# Patient Record
Sex: MALE | Race: WHITE | NOT HISPANIC OR LATINO | Employment: OTHER | URBAN - METROPOLITAN AREA
[De-identification: names, ages, dates, MRNs, and addresses within clinical notes are randomized per-mention and may not be internally consistent; named-entity substitution may affect disease eponyms.]

---

## 2020-11-06 ENCOUNTER — HOSPITAL ENCOUNTER (EMERGENCY)
Facility: MEDICAL CENTER | Age: 35
End: 2020-11-06
Attending: EMERGENCY MEDICINE

## 2020-11-06 ENCOUNTER — APPOINTMENT (OUTPATIENT)
Dept: RADIOLOGY | Facility: MEDICAL CENTER | Age: 35
End: 2020-11-06
Attending: EMERGENCY MEDICINE

## 2020-11-06 VITALS
RESPIRATION RATE: 20 BRPM | WEIGHT: 142.86 LBS | SYSTOLIC BLOOD PRESSURE: 123 MMHG | OXYGEN SATURATION: 98 % | TEMPERATURE: 98.3 F | HEART RATE: 98 BPM | BODY MASS INDEX: 20.45 KG/M2 | HEIGHT: 70 IN | DIASTOLIC BLOOD PRESSURE: 79 MMHG

## 2020-11-06 DIAGNOSIS — S83.91XA SPRAIN OF RIGHT KNEE, UNSPECIFIED LIGAMENT, INITIAL ENCOUNTER: ICD-10-CM

## 2020-11-06 PROCEDURE — 73562 X-RAY EXAM OF KNEE 3: CPT | Mod: RT

## 2020-11-06 PROCEDURE — 99283 EMERGENCY DEPT VISIT LOW MDM: CPT | Mod: EDC

## 2020-11-06 SDOH — HEALTH STABILITY: MENTAL HEALTH: HOW OFTEN DO YOU HAVE A DRINK CONTAINING ALCOHOL?: 2-3 TIMES A WEEK

## 2020-11-07 NOTE — ED NOTES
"...  Chief Complaint   Patient presents with   • Knee Pain     pt [place] and injured his right knee says he \"felt a pop\" and hyperextended knee.          /83   Pulse 82   Temp 36.5 °C (97.7 °F) (Temporal)   Resp 18   Ht 1.778 m (5' 10\")   Wt 64.8 kg (142 lb 13.7 oz)   SpO2 95%        Explained triage process, to waiting room. Asked to inform RN if questions or concerns arise.   "

## 2020-11-07 NOTE — ED NOTES
Chun Mckeon D/C'd.  Discharge instructions including s/s to return to ED, follow up appointments, hydration importance and sprain of knee provided to pt/family.    Parents verbalized understanding with no further questions and concerns.    Copy of discharge provided to pt/family.  Signed copy in chart.    Pt walked out of department with friend; pt in NAD, awake, alert, interactive and age appropriate.

## 2020-11-07 NOTE — ED NOTES
Pt walked to peds 52 and gown provided. Call light introduced. All questions and concerns addressed.

## 2020-11-07 NOTE — ED PROVIDER NOTES
"ED Provider Note      ER PROVIDER NOTE      CHIEF COMPLAINT  Chief Complaint   Patient presents with   • Knee Pain     pt stepped [place] the golf [place] today. and injured his right knee says he \"felt a pop\" and hyperextended knee.        HPI  Chun Mckeon is a 35 y.o. male who presents to the emergency department complaining of knee pain.  Patient was [place] the golf course getting ready for golf today when he walked inside and there is a on covered event that he stepped in, and feels he hyperextended his knee.  There is no twisting injury but has had pain to the knee since.  He is able to walk on it with some pain.  No focal weakness numbness or tingling.  No other injury or feelings of instability    REVIEW OF SYSTEMS  Pertinent positives include knee pain. Pertinent negatives include no weakness or numbness. See HPI for details. All other systems reviewed and are negative.    PAST MEDICAL HISTORY   has a past medical history of ASTHMA, Bronchitis, Localization-related (focal) (partial) epilepsy and epileptic syndromes with complex partial seizures, without mention of intractable epilepsy, and Tonsil, abscess.    SOCIAL HISTORY  Social History     Tobacco Use   • Smoking status: Former Smoker   • Tobacco comment: QUIT 2 MONTHS AGO   Substance Use Topics   • Alcohol use: Yes     Frequency: 2-3 times a week   • Drug use: Yes     Types: Marijuana, Cocaine, Oral, Inhaled     Comment: cannabis        SURGICAL HISTORY  patient denies any surgical history    CURRENT MEDICATIONS  Home Medications     Reviewed by Allyson Figueroa R.N. (Registered Nurse) on 11/06/20 at 1616  Med List Status: Not Addressed   Medication Last Dose Status   albuterol 108 (90 BASE) MCG/ACT Aero Soln inhalation aerosol  Active   albuterol 108 (90 BASE) MCG/ACT Aero Soln inhalation aerosol  Active                ALLERGIES  Allergies   Allergen Reactions   • Amoxicillin    • Pcn [Penicillins]        PHYSICAL EXAM  VITAL SIGNS: /83 " "  Pulse 82   Temp 36.5 °C (97.7 °F) (Temporal)   Resp 18   Ht 1.778 m (5' 10\")   Wt 64.8 kg (142 lb 13.7 oz)   SpO2 95%   BMI 20.50 kg/m²   Pulse ox interpretation: I interpret this pulse ox as normal.    Constitutional: Alert.  In no apparent distress.  HENT: Normocephalic, Atraumatic, Bilateral external ears normal. Nose normal.   Eyes: Pupils are equal and reactive. Conjunctiva normal, non-icteric.   Heart: Regular rate and rhythm, no murmurs.    Lungs: Clear to auscultation bilaterally.  Skin: Warm, Dry, No erythema, No rash.   Musculoskeletal: There is some tenderness over the medial joint line inferiorly to the right knee, no effusion, no erythema.  No laxity noted with anterior posterior drawer, no laxity noted with varus or valgus stress, positive Dinora's, otherwise no bony tenderness and no other tenderness or major deformities noted. No edema.  Neurologic: Alert, Grossly non-focal.   Psychiatric: Affect normal, Judgment normal, Mood normal, Appears appropriate    DIAGNOSTIC STUDIES / PROCEDURES        RADIOLOGY  DX-KNEE 3 VIEWS RIGHT   Final Result      No acute right knee fracture or dislocation identified.        The radiologist's interpretation of all radiological studies have been reviewed and independently viewed by me.    COURSE & MEDICAL DECISION MAKING  Nursing notes, VS, PMSFHx reviewed in chart.    4:39 PM - Patient seen and examined at bedside. Ordered for xray to evaluate his symptoms.     5:56 PM  Patient is reevaluated, updated on all results, plan for discharge      Decision Making:  This is a 35 y.o. male presented with knee pain after stepped into an uncovered event.  There is no appreciable laxity on exam, does have some pain elicited over his meniscus.  No feelings of instability, no evidence of fracture.  Patient already has crutches, will use these and follow-up with orthopedics    The patient will return for new or worsening symptoms and is stable at the time of " discharge.    The patient is referred to a primary physician for blood pressure management, diabetic screening, and for all other preventative health concerns.      DISPOSITION:  Patient will be discharged home in stable condition.    FOLLOW UP:  Sukh Gayle M.D.  [address] [facility] [id number] [phone]    In 1 week        OUTPATIENT MEDICATIONS:  New Prescriptions    No medications on file         FINAL IMPRESSION  1. Sprain of right knee, unspecified ligament, initial encounter        The note accurately reflects work and decisions made by me.  Shayne Lennon M.D.  11/6/2020  5:57 PM

## 2020-12-15 ENCOUNTER — APPOINTMENT (OUTPATIENT)
Dept: RADIOLOGY | Facility: MEDICAL CENTER | Age: 35
End: 2020-12-15
Attending: EMERGENCY MEDICINE

## 2020-12-15 ENCOUNTER — HOSPITAL ENCOUNTER (OUTPATIENT)
Facility: MEDICAL CENTER | Age: 35
End: 2020-12-16
Attending: EMERGENCY MEDICINE | Admitting: OTOLARYNGOLOGY

## 2020-12-15 DIAGNOSIS — J36 PERITONSILLAR ABSCESS: ICD-10-CM

## 2020-12-15 LAB
ANION GAP SERPL CALC-SCNC: 13 MMOL/L (ref 7–16)
BASOPHILS # BLD AUTO: 0.6 % (ref 0–1.8)
BASOPHILS # BLD: 0.1 K/UL (ref 0–0.12)
BLOOD CULTURE HOLD CXBCH: NORMAL
BLOOD CULTURE HOLD CXBCH: NORMAL
BUN SERPL-MCNC: 17 MG/DL (ref 8–22)
CALCIUM SERPL-MCNC: 10.2 MG/DL (ref 8.5–10.5)
CHLORIDE SERPL-SCNC: 99 MMOL/L (ref 96–112)
CO2 SERPL-SCNC: 26 MMOL/L (ref 20–33)
COVID ORDER STATUS COVID19: NORMAL
CREAT SERPL-MCNC: 0.8 MG/DL (ref 0.5–1.4)
EOSINOPHIL # BLD AUTO: 0.22 K/UL (ref 0–0.51)
EOSINOPHIL NFR BLD: 1.4 % (ref 0–6.9)
ERYTHROCYTE [DISTWIDTH] IN BLOOD BY AUTOMATED COUNT: 41.4 FL (ref 35.9–50)
GLUCOSE SERPL-MCNC: 84 MG/DL (ref 65–99)
HCT VFR BLD AUTO: 51.7 % (ref 42–52)
HGB BLD-MCNC: 17 G/DL (ref 14–18)
IMM GRANULOCYTES # BLD AUTO: 0.1 K/UL (ref 0–0.11)
IMM GRANULOCYTES NFR BLD AUTO: 0.6 % (ref 0–0.9)
INR PPP: 1.04 (ref 0.87–1.13)
LYMPHOCYTES # BLD AUTO: 1.95 K/UL (ref 1–4.8)
LYMPHOCYTES NFR BLD: 12.4 % (ref 22–41)
MCH RBC QN AUTO: 29.2 PG (ref 27–33)
MCHC RBC AUTO-ENTMCNC: 32.9 G/DL (ref 33.7–35.3)
MCV RBC AUTO: 88.7 FL (ref 81.4–97.8)
MONOCYTES # BLD AUTO: 1.74 K/UL (ref 0–0.85)
MONOCYTES NFR BLD AUTO: 11.1 % (ref 0–13.4)
NEUTROPHILS # BLD AUTO: 11.62 K/UL (ref 1.82–7.42)
NEUTROPHILS NFR BLD: 73.9 % (ref 44–72)
NRBC # BLD AUTO: 0 K/UL
NRBC BLD-RTO: 0 /100 WBC
PLATELET # BLD AUTO: 313 K/UL (ref 164–446)
PMV BLD AUTO: 9.2 FL (ref 9–12.9)
POTASSIUM SERPL-SCNC: 4.2 MMOL/L (ref 3.6–5.5)
PROTHROMBIN TIME: 13.9 SEC (ref 12–14.6)
RBC # BLD AUTO: 5.83 M/UL (ref 4.7–6.1)
S PYO DNA SPEC NAA+PROBE: DETECTED
SARS-COV+SARS-COV-2 AG RESP QL IA.RAPID: NOTDETECTED
SODIUM SERPL-SCNC: 138 MMOL/L (ref 135–145)
SPECIMEN SOURCE: NORMAL
WBC # BLD AUTO: 15.7 K/UL (ref 4.8–10.8)

## 2020-12-15 PROCEDURE — 80048 BASIC METABOLIC PNL TOTAL CA: CPT

## 2020-12-15 PROCEDURE — 87040 BLOOD CULTURE FOR BACTERIA: CPT

## 2020-12-15 PROCEDURE — G0378 HOSPITAL OBSERVATION PER HR: HCPCS

## 2020-12-15 PROCEDURE — 700105 HCHG RX REV CODE 258: Performed by: EMERGENCY MEDICINE

## 2020-12-15 PROCEDURE — 700101 HCHG RX REV CODE 250: Performed by: EMERGENCY MEDICINE

## 2020-12-15 PROCEDURE — 70491 CT SOFT TISSUE NECK W/DYE: CPT

## 2020-12-15 PROCEDURE — 96366 THER/PROPH/DIAG IV INF ADDON: CPT

## 2020-12-15 PROCEDURE — 700105 HCHG RX REV CODE 258: Performed by: OTOLARYNGOLOGY

## 2020-12-15 PROCEDURE — 700102 HCHG RX REV CODE 250 W/ 637 OVERRIDE(OP): Performed by: EMERGENCY MEDICINE

## 2020-12-15 PROCEDURE — 700111 HCHG RX REV CODE 636 W/ 250 OVERRIDE (IP): Performed by: OTOLARYNGOLOGY

## 2020-12-15 PROCEDURE — 700111 HCHG RX REV CODE 636 W/ 250 OVERRIDE (IP): Performed by: EMERGENCY MEDICINE

## 2020-12-15 PROCEDURE — A9270 NON-COVERED ITEM OR SERVICE: HCPCS | Performed by: OTOLARYNGOLOGY

## 2020-12-15 PROCEDURE — 96365 THER/PROPH/DIAG IV INF INIT: CPT

## 2020-12-15 PROCEDURE — 85025 COMPLETE CBC W/AUTO DIFF WBC: CPT

## 2020-12-15 PROCEDURE — C9803 HOPD COVID-19 SPEC COLLECT: HCPCS | Performed by: EMERGENCY MEDICINE

## 2020-12-15 PROCEDURE — 700117 HCHG RX CONTRAST REV CODE 255: Performed by: EMERGENCY MEDICINE

## 2020-12-15 PROCEDURE — 99285 EMERGENCY DEPT VISIT HI MDM: CPT

## 2020-12-15 PROCEDURE — 85610 PROTHROMBIN TIME: CPT

## 2020-12-15 PROCEDURE — 96375 TX/PRO/DX INJ NEW DRUG ADDON: CPT

## 2020-12-15 PROCEDURE — A9270 NON-COVERED ITEM OR SERVICE: HCPCS | Performed by: EMERGENCY MEDICINE

## 2020-12-15 PROCEDURE — 96367 TX/PROPH/DG ADDL SEQ IV INF: CPT

## 2020-12-15 PROCEDURE — 87651 STREP A DNA AMP PROBE: CPT

## 2020-12-15 PROCEDURE — 96376 TX/PRO/DX INJ SAME DRUG ADON: CPT

## 2020-12-15 PROCEDURE — 700102 HCHG RX REV CODE 250 W/ 637 OVERRIDE(OP): Performed by: OTOLARYNGOLOGY

## 2020-12-15 PROCEDURE — 87426 SARSCOV CORONAVIRUS AG IA: CPT

## 2020-12-15 RX ORDER — OXYCODONE HYDROCHLORIDE 5 MG/1
5 TABLET ORAL
Status: DISCONTINUED | OUTPATIENT
Start: 2020-12-15 | End: 2020-12-16 | Stop reason: HOSPADM

## 2020-12-15 RX ORDER — LIDOCAINE HYDROCHLORIDE 20 MG/ML
20 INJECTION, SOLUTION INFILTRATION; PERINEURAL ONCE
Status: COMPLETED | OUTPATIENT
Start: 2020-12-15 | End: 2020-12-15

## 2020-12-15 RX ORDER — SCOLOPAMINE TRANSDERMAL SYSTEM 1 MG/1
1 PATCH, EXTENDED RELEASE TRANSDERMAL
Status: DISCONTINUED | OUTPATIENT
Start: 2020-12-15 | End: 2020-12-16 | Stop reason: HOSPADM

## 2020-12-15 RX ORDER — DEXAMETHASONE SODIUM PHOSPHATE 4 MG/ML
6 INJECTION, SOLUTION INTRA-ARTICULAR; INTRALESIONAL; INTRAMUSCULAR; INTRAVENOUS; SOFT TISSUE EVERY 12 HOURS
Status: COMPLETED | OUTPATIENT
Start: 2020-12-15 | End: 2020-12-15

## 2020-12-15 RX ORDER — ONDANSETRON 2 MG/ML
4 INJECTION INTRAMUSCULAR; INTRAVENOUS ONCE
Status: COMPLETED | OUTPATIENT
Start: 2020-12-15 | End: 2020-12-15

## 2020-12-15 RX ORDER — ALBUTEROL SULFATE 90 UG/1
2 AEROSOL, METERED RESPIRATORY (INHALATION) EVERY 4 HOURS PRN
Status: DISCONTINUED | OUTPATIENT
Start: 2020-12-15 | End: 2020-12-16 | Stop reason: HOSPADM

## 2020-12-15 RX ORDER — ONDANSETRON 2 MG/ML
4 INJECTION INTRAMUSCULAR; INTRAVENOUS EVERY 4 HOURS PRN
Status: DISCONTINUED | OUTPATIENT
Start: 2020-12-15 | End: 2020-12-16 | Stop reason: HOSPADM

## 2020-12-15 RX ORDER — CLINDAMYCIN PHOSPHATE 900 MG/50ML
900 INJECTION, SOLUTION INTRAVENOUS EVERY 8 HOURS
Status: DISCONTINUED | OUTPATIENT
Start: 2020-12-15 | End: 2020-12-15

## 2020-12-15 RX ORDER — CLINDAMYCIN PHOSPHATE 900 MG/50ML
900 INJECTION, SOLUTION INTRAVENOUS EVERY 8 HOURS
Status: COMPLETED | OUTPATIENT
Start: 2020-12-16 | End: 2020-12-16

## 2020-12-15 RX ORDER — ACETAMINOPHEN 500 MG
1000 TABLET ORAL EVERY 6 HOURS
Status: DISCONTINUED | OUTPATIENT
Start: 2020-12-16 | End: 2020-12-16 | Stop reason: HOSPADM

## 2020-12-15 RX ORDER — DEXAMETHASONE SODIUM PHOSPHATE 4 MG/ML
10 INJECTION, SOLUTION INTRA-ARTICULAR; INTRALESIONAL; INTRAMUSCULAR; INTRAVENOUS; SOFT TISSUE ONCE
Status: COMPLETED | OUTPATIENT
Start: 2020-12-15 | End: 2020-12-15

## 2020-12-15 RX ORDER — IBUPROFEN 600 MG/1
600 TABLET ORAL 3 TIMES DAILY
Status: DISCONTINUED | OUTPATIENT
Start: 2020-12-15 | End: 2020-12-16 | Stop reason: HOSPADM

## 2020-12-15 RX ORDER — DIPHENHYDRAMINE HYDROCHLORIDE 50 MG/ML
25 INJECTION INTRAMUSCULAR; INTRAVENOUS EVERY 6 HOURS PRN
Status: DISCONTINUED | OUTPATIENT
Start: 2020-12-15 | End: 2020-12-16 | Stop reason: HOSPADM

## 2020-12-15 RX ORDER — HYDROCODONE BITARTRATE AND ACETAMINOPHEN 7.5; 325 MG/1; MG/1
1 TABLET ORAL EVERY 8 HOURS PRN
Qty: 15 TAB | Refills: 0 | Status: SHIPPED | OUTPATIENT
Start: 2020-12-15 | End: 2020-12-20

## 2020-12-15 RX ORDER — SODIUM CHLORIDE, SODIUM LACTATE, POTASSIUM CHLORIDE, CALCIUM CHLORIDE 600; 310; 30; 20 MG/100ML; MG/100ML; MG/100ML; MG/100ML
INJECTION, SOLUTION INTRAVENOUS CONTINUOUS
Status: ACTIVE | OUTPATIENT
Start: 2020-12-15 | End: 2020-12-16

## 2020-12-15 RX ORDER — CLINDAMYCIN HYDROCHLORIDE 300 MG/1
300 CAPSULE ORAL 4 TIMES DAILY
Qty: 40 CAP | Refills: 0 | Status: SHIPPED | OUTPATIENT
Start: 2020-12-15 | End: 2020-12-25

## 2020-12-15 RX ORDER — CLINDAMYCIN PHOSPHATE 900 MG/50ML
900 INJECTION, SOLUTION INTRAVENOUS ONCE
Status: COMPLETED | OUTPATIENT
Start: 2020-12-15 | End: 2020-12-15

## 2020-12-15 RX ORDER — MORPHINE SULFATE 4 MG/ML
4 INJECTION, SOLUTION INTRAMUSCULAR; INTRAVENOUS ONCE
Status: COMPLETED | OUTPATIENT
Start: 2020-12-15 | End: 2020-12-15

## 2020-12-15 RX ADMIN — DEXAMETHASONE SODIUM PHOSPHATE 6 MG: 4 INJECTION, SOLUTION INTRA-ARTICULAR; INTRALESIONAL; INTRAMUSCULAR; INTRAVENOUS; SOFT TISSUE at 21:11

## 2020-12-15 RX ADMIN — ONDANSETRON 4 MG: 2 INJECTION INTRAMUSCULAR; INTRAVENOUS at 16:09

## 2020-12-15 RX ADMIN — MORPHINE SULFATE 4 MG: 4 INJECTION INTRAVENOUS at 16:09

## 2020-12-15 RX ADMIN — ACETAMINOPHEN 1000 MG: 500 TABLET ORAL at 23:51

## 2020-12-15 RX ADMIN — DEXAMETHASONE SODIUM PHOSPHATE 10 MG: 4 INJECTION, SOLUTION INTRA-ARTICULAR; INTRALESIONAL; INTRAMUSCULAR; INTRAVENOUS; SOFT TISSUE at 16:17

## 2020-12-15 RX ADMIN — CLINDAMYCIN IN 5 PERCENT DEXTROSE 900 MG: 18 INJECTION, SOLUTION INTRAVENOUS at 18:37

## 2020-12-15 RX ADMIN — BENZOCAINE, BUTAMBEN, AND TETRACAINE HYDROCHLORIDE 1 SPRAY: .028; .004; .004 AEROSOL, SPRAY TOPICAL at 19:39

## 2020-12-15 RX ADMIN — SODIUM CHLORIDE, POTASSIUM CHLORIDE, SODIUM LACTATE AND CALCIUM CHLORIDE: 600; 310; 30; 20 INJECTION, SOLUTION INTRAVENOUS at 23:51

## 2020-12-15 RX ADMIN — LIDOCAINE HYDROCHLORIDE 20 ML: 20 INJECTION, SOLUTION INFILTRATION; PERINEURAL at 19:39

## 2020-12-15 RX ADMIN — IOHEXOL 100 ML: 350 INJECTION, SOLUTION INTRAVENOUS at 16:47

## 2020-12-15 RX ADMIN — CEFTRIAXONE SODIUM 1 G: 1 INJECTION, POWDER, FOR SOLUTION INTRAMUSCULAR; INTRAVENOUS at 16:14

## 2020-12-15 RX ADMIN — IBUPROFEN 600 MG: 600 TABLET, FILM COATED ORAL at 21:11

## 2020-12-15 NOTE — ED TRIAGE NOTES
"[facility]   35 y.o. male   Patient presents with:  Chief Complaint   Patient presents with   • Sore Throat     stated on Wednesday last week, patient is having trouble eating due to pain.        Pt ambulatory to triage for above complaint.     Patient has  Swollen tonsils (+3).  Patient has no stridor, or adventitious breath sounds.  Clear speech.     Pt is alert and oriented, speaking in full sentences, follows commands and responds appropriately to questions. NAD. Resp are even and unlabored.     Patient and staff wearing appropriate PPE    /96   Pulse (!) 102   Temp 36.2 °C (97.1 °F) (Temporal)   Resp 16   Ht 1.803 m (5' 11\")   Wt 65.8 kg (145 lb)   SpO2 97%   BMI 20.22 kg/m²     "

## 2020-12-16 VITALS
WEIGHT: 145 LBS | BODY MASS INDEX: 20.3 KG/M2 | RESPIRATION RATE: 20 BRPM | SYSTOLIC BLOOD PRESSURE: 110 MMHG | TEMPERATURE: 98 F | OXYGEN SATURATION: 99 % | HEART RATE: 90 BPM | DIASTOLIC BLOOD PRESSURE: 60 MMHG | HEIGHT: 71 IN

## 2020-12-16 PROCEDURE — 700102 HCHG RX REV CODE 250 W/ 637 OVERRIDE(OP): Performed by: OTOLARYNGOLOGY

## 2020-12-16 PROCEDURE — G0378 HOSPITAL OBSERVATION PER HR: HCPCS

## 2020-12-16 PROCEDURE — 96367 TX/PROPH/DG ADDL SEQ IV INF: CPT

## 2020-12-16 PROCEDURE — 96366 THER/PROPH/DIAG IV INF ADDON: CPT

## 2020-12-16 PROCEDURE — A9270 NON-COVERED ITEM OR SERVICE: HCPCS | Performed by: OTOLARYNGOLOGY

## 2020-12-16 PROCEDURE — 700101 HCHG RX REV CODE 250: Performed by: OTOLARYNGOLOGY

## 2020-12-16 RX ORDER — PREDNISONE 20 MG/1
TABLET ORAL
Qty: 6 TAB | Refills: 0 | Status: SHIPPED | OUTPATIENT
Start: 2020-12-16 | End: 2023-10-03

## 2020-12-16 RX ORDER — CLINDAMYCIN HYDROCHLORIDE 300 MG/1
300 CAPSULE ORAL 3 TIMES DAILY
Qty: 30 CAP | Refills: 0 | Status: SHIPPED | OUTPATIENT
Start: 2020-12-16 | End: 2023-10-03

## 2020-12-16 RX ADMIN — CLINDAMYCIN IN 5 PERCENT DEXTROSE 900 MG: 18 INJECTION, SOLUTION INTRAVENOUS at 03:00

## 2020-12-16 RX ADMIN — IBUPROFEN 600 MG: 600 TABLET, FILM COATED ORAL at 06:06

## 2020-12-16 RX ADMIN — ACETAMINOPHEN 1000 MG: 500 TABLET ORAL at 06:06

## 2020-12-16 NOTE — OP REPORT
DATE OF SERVICE:  12/15/2020     PREPROCEDURE DIAGNOSIS:  Right peritonsillar abscess.     POSTPROCEDURE DIAGNOSIS:  Right peritonsillar abscess.     PROCEDURE:  Incision and drainage of peritonsillar abscess.     SURGEON:  Sukh Colon MD.     INDICATIONS:  The patient has had increasing sore throat over the past six   days.  He presented to the emergency room today where evaluation including CT   scan demonstrated a 3 cm peritonsillar abscess on the right.  He presents now   for drainage.     DESCRIPTION OF PROCEDURE:  Patient had no trismus and the tonsillar fossa was   easy to access.  It was topically anesthetized with Hurricaine.  1% lidocaine   with 1:100,000 epinephrine was then used to infiltrate the superior and   anterior tonsillar pillar from the mid portion upwards.  After suitable effect   had been obtained, an 18-gauge needle was inserted into the abscess.  3 mL of   pus was obtained.  An incision was then made at the needle insertion site and   taken into the abscess.  The abscess was then more fully exteriorized with a   hemostat.  Suction was then placed.  A small amount of additional pus drained.    The patient tolerated the procedure well and there were no complications.        ______________________________  MD ZAKI WALLACE/DIEGO/BENNETT    DD:  12/15/2020 19:54  DT:  12/15/2020 22:18    Job#:  [id number]

## 2020-12-16 NOTE — PROGRESS NOTES
"S: Much better    O: /83   Pulse 87   Temp 36.6 °C (97.8 °F) (Temporal)   Resp 16   Ht 1.803 m (5' 11\")   Wt 65.8 kg (145 lb)   SpO2 99%   Recent Labs     12/15/20  1522   WBC 15.7*   RBC 5.83   HEMOGLOBIN 17.0   HEMATOCRIT 51.7   MCV 88.7   MCH 29.2   MCHC 32.9*   RDW 41.4   PLATELETCT 313   MPV 9.2     Recent Labs     12/15/20  1522   SODIUM 138   POTASSIUM 4.2   CHLORIDE 99   CO2 26   GLUCOSE 84   BUN 17   CREATININE 0.80   CALCIUM 10.2     I/O last 3 completed shifts:  In: 750 [I.V.:500]  Out: -   Decreased swelling right tonsil    A: HD 2 Right PT abscess    P: Drained last night.  Much better today.  DC home on cleocin 300 tid for 10 days and prednisone 20 bid for 3 days.  Card given for follow up.   "

## 2020-12-16 NOTE — ED NOTES
Procedure completed by ENT MD. New POC to admit pt. Pt currently resting comfortably in bed, VSS. Call light within reach. Will continue to monitor.

## 2020-12-16 NOTE — ED NOTES
Pt continues to be resting comfortably in bed. Given water per request. No complaints at this time. Call light within reach. Will continue to monitor

## 2020-12-16 NOTE — DISCHARGE INSTRUCTIONS
Peritonsillar Abscess    A peritonsillar abscess is a collection of pus in the back of the throat, behind the tonsils. It usually occurs when an infection of the throat or tonsils (tonsillitis) spreads into the tissues around the tonsils.  What are the causes?  The infection that leads to a peritonsillar abscess is usually caused by streptococcal bacteria.  What increases the risk?  You are more likely to develop this condition if:  · You have recently been diagnosed with an infection in your mouth or throat.  · You smoke.  · You have gum disease or gingivitis (periodontal disease).  What are the signs or symptoms?  Symptoms of this condition include:  · A sore throat, often with pain on just one side.  · Swollen, tender glands (lymph nodes) in the neck.  · Difficulty swallowing.  · Difficulty opening your mouth.  · Fever.  · Chills.  · Drooling because of difficulty swallowing saliva.  · Headache.  · Changes in how your voice sounds.  · Bad breath.  How is this diagnosed?  This condition may be diagnosed based on:  · Your symptoms and medical history.  · A physical exam.  · Imaging tests, such as ultrasound or CT scan.  · Testing a pus sample from the abscess. Your health care provider may collect a pus sample by swabbing the back of your throat or by removing some pus with a syringe and needle (needle aspiration).  How is this treated?  Treatment usually involves draining the pus from the abscess. This may be done through needle aspiration or by making an incision in the abscess and draining the fluid. You will also likely need to take antibiotic medicine.  Follow these instructions at home:  Medicines  · Take over-the-counter and prescription medicines only as told by your health care provider.  · If you were prescribed an antibiotic, take it as told by your health care provider. Do not stop taking the antibiotic even if your condition improves.  Eating and drinking    · Drink enough fluid to keep your urine pale  yellow.  · While your throat is sore, try only drinking liquids or eating only soft-textured foods such as yogurt and ice cream.  General instructions  · Rest as much as possible and get plenty of sleep.  · Return to your normal activities as told by your health care provider. Ask your health care provider what activities are safe for you.  · If your abscess was drained, gargle with a salt-water mixture 3-4 times a day or as needed. To make a salt-water mixture, completely dissolve ½-1 tsp of salt in 1 cup of warm water. Do not swallow this mixture.  · Do not use any products that contain nicotine or tobacco, such as cigarettes and e-cigarettes. If you need help quitting, ask your health care provider.  · Keep all follow-up visits as told by your health care provider. This is important.  Contact a health care provider if you have:  · More pain, swelling, redness, or pus in your throat.  · A headache.  · Lack of energy (lethargy).  · A general feeling of illness (malaise).  · A fever.  · Dizziness.  · Trouble swallowing.  · Trouble eating.  · Signs of dehydration, such as:  ? Light-headedness when standing.  ? Urinating less than usual.  ? A fast heart rate.  ? Dry mouth.  Get help right away if you:  · Have trouble talking.  · Have trouble breathing, or it is easier for you to breathe when you lean forward.  · Cough up blood or vomit blood.  · Have severe throat pain that does not get better with medicine.  Summary  · A peritonsillar abscess is a collection of pus in the back of the throat. It usually occurs when an infection of the throat or tonsils spreads.  · Symptoms include a sore throat, difficulty swallowing, fever, chills, and occasional drooling.  · This condition is treated by draining the abscess and taking antibiotic medicine.  · Call your health care provider if you have trouble swallowing or eating after treatment.  · Get help right away if you vomit blood or cough up blood after you receive  treatment.  This information is not intended to replace advice given to you by your health care provider. Make sure you discuss any questions you have with your health care provider.  Document Released: 12/18/2006 Document Revised: 11/30/2018 Document Reviewed: 11/01/2018  Elsevier Patient Education © 2020 [employer]

## 2020-12-16 NOTE — ED NOTES
Patient calm and in room at this time. Respirations even and unlabored. Patient denies any pain or other complaints at this time. IV ABT's infusing per MD order. Pt to be admitted. Awaiting bed placement.

## 2020-12-16 NOTE — DISCHARGE PLANNING
Patient has been fully independent, including driving, prior to this admission. Has a very supportive roommate, Tung, who will be picking patient up upon discharge.    Does not have insurance. Will need assistance in applying for Medicaid during this admission.    Does not have an Advance Directive - packet not provided since patient is aware that his parents, as next of kin, will become his decision makers, by default, should he become incapacitated.      Care Transition Team Assessment    Information Source  Orientation : Oriented x 4  Information Given By: Patient  Informant's Name: Chun Mckeon  Who is responsible for making decisions for patient? : Patient    Readmission Evaluation  Is this a readmission?: No    Elopement Risk  Legal Hold: No  Ambulatory or Self Mobile in Wheelchair: No-Not an Elopement Risk  Elopement Risk: Not at Risk for Elopement    Interdisciplinary Discharge Planning  Does Admitting Nurse Feel This Could be a Complex Discharge?: No  Primary Care Physician: KRISTIN Rios on [address] Lives with - Patient's Self Care Capacity: Unrelated Adult  Support Systems: Other (Comments)(Roommate Tung.)  Housing / Facility: 2 Story Apartment / Condo  Do You Take your Prescribed Medications Regularly: (Not on any regular medications.)  Able to Return to Previous ADL's: Yes  Mobility Issues: No  Prior Services: None  Patient Prefers to be Discharged to:: Home.  Assistance Needed: No  Durable Medical Equipment: Not Applicable    Discharge Preparedness  What is your plan after discharge?: Home with help  What are your discharge supports?: Parent, Other (comment)(Roommate Tung.)  Prior Functional Level: Ambulatory, Drives Self, Independent with Activities of Daily Living, Independent with Medication Management  Difficulity with ADLs: None  Difficulity with IADLs: None    Functional Assesment  Prior Functional Level: Ambulatory, Drives Self, Independent with Activities of Daily Living, Independent with  Medication Management    Finances  Financial Barriers to Discharge: No(Is employed.)  Prescription Coverage: No(Will apply for Medicaid.)    Vision / Hearing Impairment  Vision Impairment : Yes  Right Eye Vision: Impaired, Wears Glasses, Wears Contacts  Left Eye Vision: Impaired, Wears Glasses, Wears Contacts  Hearing Impairment : No    Values / Beliefs / Concerns  Values / Beliefs Concerns : No    Advance Directive  Advance Directive?: None(Understands his parents are decision makers by default.)  Advance Directive offered?: AD Booklet refused    Domestic Abuse  Have you ever been the victim of abuse or violence?: No  Physical Abuse or Sexual Abuse: No  Verbal Abuse or Emotional Abuse: No  Possible Abuse/Neglect Reported to:: Not Applicable    Psychological Assessment  History of Substance Abuse: Alcohol(Distant past.)  Substance Abuse Comments: Occasional alcohol now.  History of Psychiatric Problems: No  Non-compliant with Treatment: No  Newly Diagnosed Illness: Yes(Peritonsillar abscess.)    Discharge Risks or Barriers  Discharge risks or barriers?: Uninsured / underinsured  Patient risk factors: Uninsured or underinsured    Anticipated Discharge Information  Discharge Disposition: Still a Patient (30)  Discharge Address: [address] [place] [address] [place] [address]  Discharge Contact Phone Number: [phone]

## 2020-12-16 NOTE — ED NOTES
Med rec updated and complete. Allergies reviewed.  No antibiotic use in last 14 days      Home pharmacy  [facility]

## 2020-12-16 NOTE — ED PROVIDER NOTES
ED Provider Note    CHIEF COMPLAINT  Chief Complaint   Patient presents with   • Sore Throat     stated on Wednesday last week, patient is having trouble eating due to pain.        HPI  Chun Mckeon is a 35 y.o. male who presents to the emergency room today with complaints of sore throat and difficulty swallowing.  Patient states he has had symptoms since Wednesday but becoming progressively worse today he also felt some difficulty with his airway when he tried to sleep last night.  He has had no food since yesterday.  Pain in his throat mostly on the right side.  No nausea no vomiting he has had some chills but no fever.  Symptoms again progressively worse with pain described as aching to sharp rated currently at 8/10.    REVIEW OF SYSTEMS  See HPI for further details. All other systems are negative.      PAST MEDICAL HISTORY  Past Medical History:   Diagnosis Date   • ASTHMA    • Bronchitis    • Localization-related (focal) (partial) epilepsy and epileptic syndromes with complex partial seizures, without mention of intractable epilepsy     Secondary GTC   • Tonsil, abscess        FAMILY HISTORY  Family History   Problem Relation Age of Onset   • Stroke Neg Hx        SOCIAL HISTORY  Social History     Socioeconomic History   • Marital status: Single     Spouse name: Not on file   • Number of children: Not on file   • Years of education: Not on file   • Highest education level: Not on file   Occupational History   • Not on file   Social Needs   • Financial resource strain: Not on file   • Food insecurity     Worry: Not on file     Inability: Not on file   • Transportation needs     Medical: Not on file     Non-medical: Not on file   Tobacco Use   • Smoking status: Former Smoker   • Smokeless tobacco: Never Used   • Tobacco comment: QUIT 2 MONTHS AGO   Substance and Sexual Activity   • Alcohol use: Yes     Frequency: 2-3 times a week   • Drug use: Yes     Types: Marijuana, Cocaine, Oral, Inhaled     Comment:  "cannabis    • Sexual activity: Not Currently     Partners: Female   Lifestyle   • Physical activity     Days per week: Not on file     Minutes per session: Not on file   • Stress: Not on file   Relationships   • Social connections     Talks on phone: Not on file     Gets together: Not on file     Attends Buddhist service: Not on file     Active member of club or organization: Not on file     Attends meetings of clubs or organizations: Not on file     Relationship status: Not on file   • Intimate partner violence     Fear of current or ex partner: Not on file     Emotionally abused: Not on file     Physically abused: Not on file     Forced sexual activity: Not on file   Other Topics Concern   • Not on file   Social History Narrative   • Not on file       SURGICAL HISTORY  No past surgical history on file.    CURRENT MEDICATIONS  Home Medications     Reviewed by Gavino Archibald R.N. (Registered Nurse) on 12/15/20 at [address]  Med List Status: <None>   Medication Last Dose Status   albuterol 108 (90 BASE) MCG/ACT Aero Soln inhalation aerosol  Active   albuterol 108 (90 BASE) MCG/ACT Aero Soln inhalation aerosol  Active                ALLERGIES  Allergies   Allergen Reactions   • Amoxicillin      Has tolerated cephalosporins in the past   • Pcn [Penicillins]        PHYSICAL EXAM  VITAL SIGNS: /74   Pulse 98   Temp 37.3 °C (99.2 °F) (Oral)   Resp 16   Ht 1.803 m (5' 11\")   Wt 65.8 kg (145 lb)   SpO2 97%   BMI 20.22 kg/m²       Constitutional :  Well developed, Well nourished, No acute distress, Non-toxic appearance.   HENT: There is evidence of swelling over the right peritonsillar area with some deviation of the tonsil consistent with peritonsillar abscess there is erythema noted some purulent discharge cultures were obtained and pending.  Eyes: perrla  Neck: Normal range of motion, No tenderness, Supple, No stridor.   Lymphatic: Right submandibular lymphadenopathy.   Cardiovascular: Normal " heart rate, Normal rhythm, No murmurs, No rubs, No gallops.   Thorax & Lungs: Clear to auscultation all lung fields  Skin: Warm, Dry, No erythema, No rash.   Extremities: Intact distal pulses, No edema, No tenderness, No cyanosis, No clubbing.       RADIOLOGY/PROCEDURES  CT-SOFT TISSUE NECK WITH   Final Result      Right peritonsillar abscess measuring 3.1 x 2.3 x 2.6 cm with mass effect on the airway. Edema is seen extending along the right hypopharynx with mild edema along the right aryepiglottic fold.      Cervical lymphadenopathy, right greater than left is likely reactive.      Mild paranasal sinus disease.      Mild loss of height of T4, T5 and T6 has a chronic appearance.               COURSE & MEDICAL DECISION MAKING  Pertinent Labs & Imaging studies reviewed. (See chart for details)  CT scan does show right peritonsillar abscess patient was given Rocephin and placed on clindamycin Decadron was given IV.  Patient given pain medication strep screen was positive.  Discussed case with Dr. Colon who will be in to evaluate patient and possible drainage of peritonsillar abscess.  Patient be placed on clindamycin and Norco for pain may also use Tylenol Motrin.  Follow-up as directed return if persistent or worsening symptoms.  Covid test was negative.    FINAL IMPRESSION  1.  Acute peritonsillar abscess  2.  Strep tonsillitis/pharyngitis  3.      Electronically signed by: Nimesh Alaniz D.O., 12/15/2020

## 2020-12-16 NOTE — ED NOTES
Patient calm and in room at this time. Respirations even and unlabored. Patient denies any pain or other complaints at this time. Pt to be admitted. Awaiting bed placement.

## 2020-12-16 NOTE — ED NOTES
Patient calm and in room at this time. Respirations even and unlabored. Patient denies any pain or other complaints at this time. Pt up and ambulated to restroom and arian without issue. Gait steady and even. VS stable. Pt to be admitted. Awaiting bed placement.

## 2020-12-16 NOTE — H&P
DATE OF ADMISSION:  12/15/2020        DATE OF CONSULTATION: 12/15/2020.     HISTORY OF PRESENT ILLNESS:  The patient is a 35-year-old who has had   increasing sore throat over the past six days.  The patient began to have some   difficulty breathing last night when he was trying to sleep.  He has not been   able to eat since yesterday.  The pain is mostly on the right side.  He has a   history of recurrent peritonsillar abscess with two other episodes, last   about four years ago.     PAST MEDICAL HISTORY:    1.  Asthma.  2.  History of seizure disorder, currently resolved.  3.  History of recurrent peritonsillar abscesses as per HPI.     PAST SURGICAL HISTORY:  None.     ALLERGIES:  AMOXICILLIN, BUT HAS TOLERATED CEPHALOSPORINS IN THE PAST.     MEDICATIONS:  Albuterol, metered dose inhaler, 1 to 2 puffs every 6 hours as   needed.     REVIEW OF SYSTEMS:  The patient feels well and has no other medical issues   other than sore throat.     PHYSICAL EXAMINATION:    GENERAL:  Well-developed, well-nourished male in no acute distress.  VITAL SIGNS:  T-max 99.2, blood pressure 115/74, pulse 98, SpO2 97.  HEENT:  Pinnas, external auditory canals and tympanic membranes are normal.    The nasal exam externally is within normal limits.  The oral exam demonstrates   moderate swelling of the right peritonsillar region into the soft palate.    There is no trismus.    NECK:  Palpation of the neck is within normal limits.       DIAGNOSTIC STUDIES:  Review of the CT scan shows a 3 cm unilocular apparent   fluid collection.     LABORATORY DATA:  Reveals a white count of 15.7 with 73 neutrophils, 12   lymphocytes.  INR is 1.04.  Chemistries are within normal limits.  A strep   screen did reveal group A strep.  COVID test was negative.     IMPRESSION:  Right peritonsillar abscess.     PLAN:  In the Emergency Room the abscess was drained.  Please see this under a   separate note.  The patient tolerated the procedure well and 3 mL of pus  were   obtained.  An incision was then made and the abscess more fully exteriorized   and a little additional pus was obtained.  The patient tolerated the procedure   well and there were no complications.  He will be admitted overnight for   intravenous antibiotics and steroids and discharged in the morning.  I have   recommended tonsillectomy as this has been a recurrent problem.              ______________________________  DENISHA ADAMS MD    DLM/SUP    DD:  12/15/2020 19:52  DT:  12/15/2020 22:37    Job#:  [id number]

## 2020-12-16 NOTE — ED PROVIDER NOTES
ED PROVIDER NOTE    Scribed for Piedad Yanez M.D. by Jeremie Danielle. 12/15/2020, 6:00 PM.    This is an addendum to the note on Chun Mckeon. For further details and full chart entry, see the previously signed ED Provider Note written by Dr. Alaniz (ERP).      6:00 PM - I discussed the patient's case with Dr. Alaniz (ERP) who will transfer care of the patient to me at this time. Patient appears to have a peritonsillar abscess which is pending drainage by ENT. Plant to D/C afterwards with ENT follow up.      8:21 PM - RN reports that Dr. Colon (ENT) left after draining the abscess and requesting patient be admitted. Will attempt to contact him.     8:33 PM - I discussed the patient's case and the above findings with Dr. Colon (ENT) who informs me that he plans to admit the patient and will place orders in shortly.     FINAL IMPRESSION   1. Peritonsillar abscess         Jeremie TRAN (Sirena), am scribing for, and in the presence of, Piedad Yanez M.D..    Electronically signed by: Jeremie Danielle (Parvizibkerry), 12/15/2020    Piedad TRAN M.D. personally performed the services described in this documentation, as scribed by Jeremie Danielle in my presence, and it is both accurate and complete.    The note accurately reflects work and decisions made by me.  Piedad Yanez M.D.  12/16/2020  12:29 AM

## 2020-12-20 LAB
BACTERIA BLD CULT: NORMAL
BACTERIA BLD CULT: NORMAL
SIGNIFICANT IND 70042: NORMAL
SIGNIFICANT IND 70042: NORMAL
SITE SITE: NORMAL
SITE SITE: NORMAL
SOURCE SOURCE: NORMAL
SOURCE SOURCE: NORMAL

## 2022-12-01 ENCOUNTER — HOSPITAL ENCOUNTER (EMERGENCY)
Facility: MEDICAL CENTER | Age: 37
End: 2022-12-01
Attending: EMERGENCY MEDICINE
Payer: COMMERCIAL

## 2022-12-01 VITALS
DIASTOLIC BLOOD PRESSURE: 64 MMHG | WEIGHT: 158 LBS | HEART RATE: 89 BPM | BODY MASS INDEX: 22.12 KG/M2 | OXYGEN SATURATION: 90 % | RESPIRATION RATE: 20 BRPM | SYSTOLIC BLOOD PRESSURE: 106 MMHG | HEIGHT: 71 IN | TEMPERATURE: 98 F

## 2022-12-01 DIAGNOSIS — R56.9 SEIZURE (HCC): ICD-10-CM

## 2022-12-01 DIAGNOSIS — B34.9 VIRAL ILLNESS: ICD-10-CM

## 2022-12-01 LAB
ALBUMIN SERPL BCP-MCNC: 4.6 G/DL (ref 3.2–4.9)
ALBUMIN/GLOB SERPL: 1.6 G/DL
ALP SERPL-CCNC: 80 U/L (ref 30–99)
ALT SERPL-CCNC: 17 U/L (ref 2–50)
ANION GAP SERPL CALC-SCNC: 16 MMOL/L (ref 7–16)
AST SERPL-CCNC: 20 U/L (ref 12–45)
BASOPHILS # BLD AUTO: 1.1 % (ref 0–1.8)
BASOPHILS # BLD: 0.1 K/UL (ref 0–0.12)
BILIRUB SERPL-MCNC: 2.1 MG/DL (ref 0.1–1.5)
BUN SERPL-MCNC: 16 MG/DL (ref 8–22)
CALCIUM SERPL-MCNC: 9.5 MG/DL (ref 8.5–10.5)
CHLORIDE SERPL-SCNC: 102 MMOL/L (ref 96–112)
CO2 SERPL-SCNC: 18 MMOL/L (ref 20–33)
CREAT SERPL-MCNC: 1.01 MG/DL (ref 0.5–1.4)
EOSINOPHIL # BLD AUTO: 0.24 K/UL (ref 0–0.51)
EOSINOPHIL NFR BLD: 2.6 % (ref 0–6.9)
ERYTHROCYTE [DISTWIDTH] IN BLOOD BY AUTOMATED COUNT: 44.4 FL (ref 35.9–50)
GFR SERPLBLD CREATININE-BSD FMLA CKD-EPI: 99 ML/MIN/1.73 M 2
GLOBULIN SER CALC-MCNC: 2.8 G/DL (ref 1.9–3.5)
GLUCOSE SERPL-MCNC: 170 MG/DL (ref 65–99)
HCT VFR BLD AUTO: 47.5 % (ref 42–52)
HGB BLD-MCNC: 15.8 G/DL (ref 14–18)
IMM GRANULOCYTES # BLD AUTO: 0.03 K/UL (ref 0–0.11)
IMM GRANULOCYTES NFR BLD AUTO: 0.3 % (ref 0–0.9)
LYMPHOCYTES # BLD AUTO: 1.96 K/UL (ref 1–4.8)
LYMPHOCYTES NFR BLD: 21 % (ref 22–41)
MCH RBC QN AUTO: 29 PG (ref 27–33)
MCHC RBC AUTO-ENTMCNC: 33.3 G/DL (ref 33.7–35.3)
MCV RBC AUTO: 87.3 FL (ref 81.4–97.8)
MONOCYTES # BLD AUTO: 0.94 K/UL (ref 0–0.85)
MONOCYTES NFR BLD AUTO: 10.1 % (ref 0–13.4)
NEUTROPHILS # BLD AUTO: 6.06 K/UL (ref 1.82–7.42)
NEUTROPHILS NFR BLD: 64.9 % (ref 44–72)
NRBC # BLD AUTO: 0 K/UL
NRBC BLD-RTO: 0 /100 WBC
PLATELET # BLD AUTO: 222 K/UL (ref 164–446)
PMV BLD AUTO: 9.4 FL (ref 9–12.9)
POTASSIUM SERPL-SCNC: 4.2 MMOL/L (ref 3.6–5.5)
PROT SERPL-MCNC: 7.4 G/DL (ref 6–8.2)
RBC # BLD AUTO: 5.44 M/UL (ref 4.7–6.1)
SODIUM SERPL-SCNC: 136 MMOL/L (ref 135–145)
WBC # BLD AUTO: 9.3 K/UL (ref 4.8–10.8)

## 2022-12-01 PROCEDURE — 96374 THER/PROPH/DIAG INJ IV PUSH: CPT

## 2022-12-01 PROCEDURE — 80053 COMPREHEN METABOLIC PANEL: CPT

## 2022-12-01 PROCEDURE — 99284 EMERGENCY DEPT VISIT MOD MDM: CPT

## 2022-12-01 PROCEDURE — 36415 COLL VENOUS BLD VENIPUNCTURE: CPT

## 2022-12-01 PROCEDURE — 85025 COMPLETE CBC W/AUTO DIFF WBC: CPT

## 2022-12-01 PROCEDURE — 700111 HCHG RX REV CODE 636 W/ 250 OVERRIDE (IP): Performed by: EMERGENCY MEDICINE

## 2022-12-01 PROCEDURE — 96375 TX/PRO/DX INJ NEW DRUG ADDON: CPT

## 2022-12-01 PROCEDURE — 700105 HCHG RX REV CODE 258: Performed by: EMERGENCY MEDICINE

## 2022-12-01 RX ORDER — SODIUM CHLORIDE 9 MG/ML
1000 INJECTION, SOLUTION INTRAVENOUS ONCE
Status: COMPLETED | OUTPATIENT
Start: 2022-12-01 | End: 2022-12-01

## 2022-12-01 RX ORDER — LORAZEPAM 2 MG/ML
1 INJECTION INTRAMUSCULAR ONCE
Status: COMPLETED | OUTPATIENT
Start: 2022-12-01 | End: 2022-12-01

## 2022-12-01 RX ORDER — LEVETIRACETAM 500 MG/5ML
30 INJECTION, SOLUTION, CONCENTRATE INTRAVENOUS ONCE
Status: COMPLETED | OUTPATIENT
Start: 2022-12-01 | End: 2022-12-01

## 2022-12-01 RX ORDER — LEVETIRACETAM 500 MG/1
500 TABLET ORAL 2 TIMES DAILY
Qty: 60 TABLET | Refills: 2 | Status: SHIPPED | OUTPATIENT
Start: 2022-12-01 | End: 2022-12-31

## 2022-12-01 RX ADMIN — LORAZEPAM 1 MG: 2 INJECTION INTRAMUSCULAR; INTRAVENOUS at 06:45

## 2022-12-01 RX ADMIN — SODIUM CHLORIDE 1000 ML: 9 INJECTION, SOLUTION INTRAVENOUS at 06:48

## 2022-12-01 RX ADMIN — LEVETIRACETAM 2150 MG: 100 INJECTION, SOLUTION INTRAVENOUS at 06:48

## 2022-12-01 NOTE — ED PROVIDER NOTES
ED Provider Note    CHIEF COMPLAINT  Chief Complaint   Patient presents with    Seizure     Seizure x 30 sec. History of Seizures. Off Keppra for 2-3 months.       HPI  Chun Mckeon is a 37 y.o. male who presents for evaluation after a grand mal seizure that lasted approximately 30 seconds.  The patient has a known history of seizures.  He states that he has been out of his Keppra for about a month.  He states has been unable to follow-up on an outpatient basis and get his refill.  He states has been taking Keppra 750 mg twice a day.  He states he did have some diarrhea yesterday.  He states that after the seizure does have some diffuse myalgias.  He is also noticed some congestion recently.  He does have a headache which is typical for him after his seizure.  He also bit the right side of his tongue.  Otherwise he is unaware of any injuries from the seizure.  The patient's last seizure was about 3 months ago.    REVIEW OF SYSTEMS  See HPI for further details. All other systems are negative.     PAST MEDICAL HISTORY  Past Medical History:   Diagnosis Date    ASTHMA     Bronchitis     Localization-related (focal) (partial) epilepsy and epileptic syndromes with complex partial seizures, without mention of intractable epilepsy     Secondary GTC    Tonsil, abscess        FAMILY HISTORY  [unfilled]    SOCIAL HISTORY  Social History     Socioeconomic History    Marital status: Single   Tobacco Use    Smoking status: Former    Smokeless tobacco: Never    Tobacco comments:     QUIT 2 MONTHS AGO   Vaping Use    Vaping Use: Every day   Substance and Sexual Activity    Alcohol use: Yes    Drug use: Yes     Types: Marijuana, Cocaine, Oral, Inhaled     Comment: cannabis     Sexual activity: Not Currently     Partners: Female       SURGICAL HISTORY  No past surgical history on file.    CURRENT MEDICATIONS  Home Medications    **Home medications have not yet been reviewed for this encounter**         ALLERGIES  Allergies  "  Allergen Reactions    Amoxicillin      Has tolerated cephalosporins in the past    Pcn [Penicillins]        PHYSICAL EXAM  VITAL SIGNS: /72   Pulse 89   Temp 36.8 °C (98.3 °F) (Temporal)   Resp 18   Ht 1.803 m (5' 11\")   Wt 71.7 kg (158 lb)   SpO2 94%   BMI 22.04 kg/m²       Constitutional: Well developed, Well nourished, No acute distress, Non-toxic appearance.   HENT: Normocephalic, Atraumatic, Bilateral external ears normal, Oropharynx small puncture wounds to the lateral aspect of the right tongue, Nose normal.   Eyes: PERRLA, EOMI, Conjunctiva normal, No discharge.   Neck: Normal range of motion, No tenderness, Supple, No stridor.   Lymphatic: No lymphadenopathy noted.   Cardiovascular: Normal heart rate, Normal rhythm, No murmurs, No rubs, No gallops.   Thorax & Lungs: Mild diffuse rhonchi, No respiratory distress, No wheezing, No chest tenderness.   Abdomen: Bowel sounds normal, Soft, No tenderness, No masses, No pulsatile masses.   Skin: Warm, Dry, No erythema, No rash.   Back: No tenderness, No CVA tenderness.   Extremities: Intact distal pulses, No edema, No tenderness, No cyanosis, No clubbing.   Neurologic: Alert & oriented x 3, Normal motor function, Normal sensory function, No focal deficits noted.   Psychiatric: Affect normal, Judgment normal, Mood normal.     Results for orders placed or performed during the hospital encounter of 12/01/22   CBC WITH DIFFERENTIAL   Result Value Ref Range    WBC 9.3 4.8 - 10.8 K/uL    RBC 5.44 4.70 - 6.10 M/uL    Hemoglobin 15.8 14.0 - 18.0 g/dL    Hematocrit 47.5 42.0 - 52.0 %    MCV 87.3 81.4 - 97.8 fL    MCH 29.0 27.0 - 33.0 pg    MCHC 33.3 (L) 33.7 - 35.3 g/dL    RDW 44.4 35.9 - 50.0 fL    Platelet Count 222 164 - 446 K/uL    MPV 9.4 9.0 - 12.9 fL    Neutrophils-Polys 64.90 44.00 - 72.00 %    Lymphocytes 21.00 (L) 22.00 - 41.00 %    Monocytes 10.10 0.00 - 13.40 %    Eosinophils 2.60 0.00 - 6.90 %    Basophils 1.10 0.00 - 1.80 %    Immature " Granulocytes 0.30 0.00 - 0.90 %    Nucleated RBC 0.00 /100 WBC    Neutrophils (Absolute) 6.06 1.82 - 7.42 K/uL    Lymphs (Absolute) 1.96 1.00 - 4.80 K/uL    Monos (Absolute) 0.94 (H) 0.00 - 0.85 K/uL    Eos (Absolute) 0.24 0.00 - 0.51 K/uL    Baso (Absolute) 0.10 0.00 - 0.12 K/uL    Immature Granulocytes (abs) 0.03 0.00 - 0.11 K/uL    NRBC (Absolute) 0.00 K/uL   COMP METABOLIC PANEL   Result Value Ref Range    Sodium 136 135 - 145 mmol/L    Potassium 4.2 3.6 - 5.5 mmol/L    Chloride 102 96 - 112 mmol/L    Co2 18 (L) 20 - 33 mmol/L    Anion Gap 16.0 7.0 - 16.0    Glucose 170 (H) 65 - 99 mg/dL    Bun 16 8 - 22 mg/dL    Creatinine 1.01 0.50 - 1.40 mg/dL    Calcium 9.5 8.5 - 10.5 mg/dL    AST(SGOT) 20 12 - 45 U/L    ALT(SGPT) 17 2 - 50 U/L    Alkaline Phosphatase 80 30 - 99 U/L    Total Bilirubin 2.1 (H) 0.1 - 1.5 mg/dL    Albumin 4.6 3.2 - 4.9 g/dL    Total Protein 7.4 6.0 - 8.2 g/dL    Globulin 2.8 1.9 - 3.5 g/dL    A-G Ratio 1.6 g/dL   ESTIMATED GFR   Result Value Ref Range    GFR (CKD-EPI) 99 >60 mL/min/1.73 m 2       COURSE & MEDICAL DECISION MAKING  Pertinent Labs & Imaging studies reviewed. (See chart for details)  This a 37-year-old gentleman who presents after a seizure.  He did have some congestion Lifes night and did not feel well.  Therefore I suspect his seizure threshold has been decreased by a viral process.  The patient has been seizure-free throughout his stay in the emergency department.  I did load the patient with Keppra and also gave him Ativan for seizure prophylaxis.  I will place the patient back on Keppra and put in a note for the scheduling department to get the patient into [facility] neurology within the next 30 days.  The patient will be treated supportively for the viral process.  He will return for any further seizure activity.  Laboratory analysis is reassuring he does have a slight acidosis most likely from the seizure and he did receive a liter of fluid.  He does not have a  leukocytosis.    FINAL IMPRESSION  1.  Seizure  2.  Viral illness    Disposition  The patient will be discharged in stable condition         Electronically signed by: Estiven Pillai M.D., 12/1/2022 6:26 AM

## 2022-12-01 NOTE — DISCHARGE INSTRUCTIONS
Stay well-hydrated.  Take Motrin as needed for fever and pain control.  Take the Keppra as prescribed and follow-up with neurology within 30 days.

## 2022-12-01 NOTE — ED TRIAGE NOTES
Pt presents to the ED via EMS for Seizure lasting approximately 30 sec's. Pt has a h/x of seizures and has been out of his Keppra for 2-3 months. Pt is complaining of headache. Pt is A&O x 4. Pt has a small abrasion on his tongue from the seizure. Pt has a complaint of chronic back pain. Pt states that he believes that when his back pain flares up that he can feel these seizures coming on. Pt breathing is easy and non labored. Pt denies cp or sob. Pt ambulated to the [facility] from the EMS cot with a steady gait.. Pt skin is w/d

## 2023-04-03 ENCOUNTER — HOSPITAL ENCOUNTER (EMERGENCY)
Facility: MEDICAL CENTER | Age: 38
End: 2023-04-03
Attending: EMERGENCY MEDICINE
Payer: COMMERCIAL

## 2023-04-03 VITALS
RESPIRATION RATE: 16 BRPM | BODY MASS INDEX: 21.05 KG/M2 | OXYGEN SATURATION: 98 % | HEIGHT: 70 IN | SYSTOLIC BLOOD PRESSURE: 118 MMHG | DIASTOLIC BLOOD PRESSURE: 78 MMHG | TEMPERATURE: 98 F | HEART RATE: 80 BPM | WEIGHT: 147.05 LBS

## 2023-04-03 DIAGNOSIS — R56.9 SEIZURE (HCC): ICD-10-CM

## 2023-04-03 LAB
ALBUMIN SERPL BCP-MCNC: 5.1 G/DL (ref 3.2–4.9)
ALBUMIN/GLOB SERPL: 1.6 G/DL
ALP SERPL-CCNC: 84 U/L (ref 30–99)
ALT SERPL-CCNC: 23 U/L (ref 2–50)
ANION GAP SERPL CALC-SCNC: 13 MMOL/L (ref 7–16)
AST SERPL-CCNC: 29 U/L (ref 12–45)
BASOPHILS # BLD AUTO: 0.5 % (ref 0–1.8)
BASOPHILS # BLD: 0.07 K/UL (ref 0–0.12)
BILIRUB SERPL-MCNC: 1.7 MG/DL (ref 0.1–1.5)
BUN SERPL-MCNC: 13 MG/DL (ref 8–22)
CALCIUM ALBUM COR SERPL-MCNC: 8.7 MG/DL (ref 8.5–10.5)
CALCIUM SERPL-MCNC: 9.6 MG/DL (ref 8.5–10.5)
CHLORIDE SERPL-SCNC: 102 MMOL/L (ref 96–112)
CO2 SERPL-SCNC: 22 MMOL/L (ref 20–33)
CREAT SERPL-MCNC: 0.83 MG/DL (ref 0.5–1.4)
EOSINOPHIL # BLD AUTO: 0.12 K/UL (ref 0–0.51)
EOSINOPHIL NFR BLD: 0.8 % (ref 0–6.9)
ERYTHROCYTE [DISTWIDTH] IN BLOOD BY AUTOMATED COUNT: 40.8 FL (ref 35.9–50)
GFR SERPLBLD CREATININE-BSD FMLA CKD-EPI: 116 ML/MIN/1.73 M 2
GLOBULIN SER CALC-MCNC: 3.1 G/DL (ref 1.9–3.5)
GLUCOSE SERPL-MCNC: 99 MG/DL (ref 65–99)
HCT VFR BLD AUTO: 53.4 % (ref 42–52)
HGB BLD-MCNC: 17.6 G/DL (ref 14–18)
IMM GRANULOCYTES # BLD AUTO: 0.05 K/UL (ref 0–0.11)
IMM GRANULOCYTES NFR BLD AUTO: 0.3 % (ref 0–0.9)
LYMPHOCYTES # BLD AUTO: 1.13 K/UL (ref 1–4.8)
LYMPHOCYTES NFR BLD: 7.6 % (ref 22–41)
MCH RBC QN AUTO: 28.4 PG (ref 27–33)
MCHC RBC AUTO-ENTMCNC: 33 G/DL (ref 33.7–35.3)
MCV RBC AUTO: 86.3 FL (ref 81.4–97.8)
MONOCYTES # BLD AUTO: 0.59 K/UL (ref 0–0.85)
MONOCYTES NFR BLD AUTO: 4 % (ref 0–13.4)
NEUTROPHILS # BLD AUTO: 12.87 K/UL (ref 1.82–7.42)
NEUTROPHILS NFR BLD: 86.8 % (ref 44–72)
NRBC # BLD AUTO: 0 K/UL
NRBC BLD-RTO: 0 /100 WBC
PLATELET # BLD AUTO: 209 K/UL (ref 164–446)
PMV BLD AUTO: 9.2 FL (ref 9–12.9)
POTASSIUM SERPL-SCNC: 3.8 MMOL/L (ref 3.6–5.5)
PROT SERPL-MCNC: 8.2 G/DL (ref 6–8.2)
RBC # BLD AUTO: 6.19 M/UL (ref 4.7–6.1)
SODIUM SERPL-SCNC: 137 MMOL/L (ref 135–145)
WBC # BLD AUTO: 14.8 K/UL (ref 4.8–10.8)

## 2023-04-03 PROCEDURE — 85025 COMPLETE CBC W/AUTO DIFF WBC: CPT

## 2023-04-03 PROCEDURE — 99284 EMERGENCY DEPT VISIT MOD MDM: CPT

## 2023-04-03 PROCEDURE — 80053 COMPREHEN METABOLIC PANEL: CPT

## 2023-04-03 PROCEDURE — 700111 HCHG RX REV CODE 636 W/ 250 OVERRIDE (IP): Performed by: EMERGENCY MEDICINE

## 2023-04-03 PROCEDURE — 36415 COLL VENOUS BLD VENIPUNCTURE: CPT

## 2023-04-03 PROCEDURE — 96374 THER/PROPH/DIAG INJ IV PUSH: CPT

## 2023-04-03 RX ORDER — LEVETIRACETAM 500 MG/1
500 TABLET ORAL 2 TIMES DAILY
Qty: 60 TABLET | Refills: 2 | Status: ON HOLD | OUTPATIENT
Start: 2023-04-03 | End: 2023-10-04

## 2023-04-03 RX ORDER — LEVETIRACETAM 500 MG/5ML
1500 INJECTION, SOLUTION, CONCENTRATE INTRAVENOUS ONCE
Status: COMPLETED | OUTPATIENT
Start: 2023-04-03 | End: 2023-04-03

## 2023-04-03 RX ORDER — LEVETIRACETAM 500 MG/5ML
1500 INJECTION, SOLUTION, CONCENTRATE INTRAVENOUS EVERY 12 HOURS
Status: DISCONTINUED | OUTPATIENT
Start: 2023-04-03 | End: 2023-04-03

## 2023-04-03 RX ADMIN — LEVETIRACETAM 1500 MG: 100 INJECTION, SOLUTION INTRAVENOUS at 13:54

## 2023-04-03 ASSESSMENT — FIBROSIS 4 INDEX: FIB4 SCORE: 0.76

## 2023-04-03 NOTE — ED NOTES
Patient discharged home per ERP.  Discharge teaching and education discussed with patient. POC discussed.   Patient verbalized understanding of discharge teaching and education. No other questions at this time.     RX for keppra sent to pt's pharmacy.   PIV removed.     VSS. Patient alert and oriented. Patient arranged ride for self. Able to ambulate with steady gait. Waiting for registration prior to discharge.

## 2023-04-03 NOTE — DISCHARGE INSTRUCTIONS
No driving until cleared by Dr. Montgomery or other neurologist and seizure-free on medications for at least 3 months    Would avoid alcohol until you are seizure-free for at least 3 months

## 2023-04-03 NOTE — ED TRIAGE NOTES
"Chief Complaint   Patient presents with    Seizure     Pt states he had a seizure this morning. Pt states Hx of seizure in the past, takes keppra but has been out for the past few months. Pt states he still feels \"a little fuzzy but otherwise ok\".     Pt educated upon triage process and told to inform  staff of any changes in condition so that Pt may be reassessed. No further questions at this time. Pt sitting out in lobby.    "

## 2023-04-03 NOTE — ED PROVIDER NOTES
"  ER Provider Note    Scribed for Fredy Luis M.d. by Susan Rosas. 4/3/2023  12:54 PM    Primary Care Provider: Pcp Pt States None    CHIEF COMPLAINT  Chief Complaint   Patient presents with    Seizure     Pt states he had a seizure this morning. Pt states Hx of seizure in the past, takes keppra but has been out for the past few months. Pt states he still feels \"a little fuzzy but otherwise ok\".     EXTERNAL RECORDS REVIEWED  Outpatient Notes The patient had a CT scan 12/12 which were not revealing for any masses. He was last hospitalized 8/22 where he had encephalopathy related to his seizures      HPI/ROS  LIMITATION TO HISTORY   Select: : None  OUTSIDE HISTORIAN(S):  Family The patient's sister and father assisted with the patients history.    Chun Mckeon is a 37 y.o. male, with a history of previous seizures, who presents to the ED complaining of seizure onset prior to arrival. The patient states that he has been off of Keppra for 2 months due to having issues seeing a neurologist. He does not have seizures when he is on Keppra.He last remembers that his girlfriend was on the phone and folding laundry before he blacked out. When he woke, she told him he had a seizure. Typically, he has precursor warnings that a seizure is coming on such as lightheadedness and tingling sensations, but this did not happen prior to his seizure today. Additionally, he notes that his postictal symptoms are much more mild in comparison to previous seizures. He denies any recent bad headaches in the last 3 weeks. He bit his tongue during the seizure. He noted that he vomited blood but believes  that has to do with biting his tongue He denies any urinary or bowel incontinence. His last seizure was 4 months ago. Prior to that, he did not have a seizure for 8 years. Per his sister, his doctors began to ween him off his medications, and he did not have a seizure for a while during this process. She notes that he was " "told that his diagnostic studies did not reveal that he had epilepsy. Per his sister, the patient's girlfriend has mentioned in passing that he has had some side effects from Keppra and wondered if he should try a new medication. He previously took 500 mg of Keppra twice per today. Additionally, he has had some problems with alcohol consumption. He owns a bar. He does not drink every day, however, when he is around friends he tends to binge. He denies tremors after not drinking for a few days, his family notes that he is typically irritable. His last drink was 4 days ago.     PAST MEDICAL HISTORY  Past Medical History:   Diagnosis Date    ASTHMA     Bronchitis     Localization-related (focal) (partial) epilepsy and epileptic syndromes with complex partial seizures, without mention of intractable epilepsy     Secondary GTC    Tonsil, abscess        SURGICAL HISTORY  History reviewed. No pertinent surgical history.    FAMILY HISTORY  Family History   Problem Relation Age of Onset    Stroke Neg Hx        SOCIAL HISTORY   reports that he has quit smoking. He has never used smokeless tobacco. He reports current alcohol use. He reports current drug use. Drugs: Marijuana, Cocaine, Oral, and Inhaled.    CURRENT MEDICATIONS  Previous Medications    ALBUTEROL 108 (90 BASE) MCG/ACT AERO SOLN INHALATION AEROSOL    Inhale 2 Puffs by mouth every 6 hours as needed for Shortness of Breath.    ALBUTEROL 108 (90 BASE) MCG/ACT AERO SOLN INHALATION AEROSOL    Inhale 2 Puffs by mouth every four hours as needed for Shortness of Breath.    CLINDAMYCIN (CLEOCIN) 300 MG CAP    Take 1 Cap by mouth 3 times a day.    PREDNISONE (DELTASONE) 20 MG TAB    1 pill twice a day for three days       ALLERGIES  Amoxicillin and Pcn [penicillins]    PHYSICAL EXAM  /82   Pulse 81   Temp 36.6 °C (97.8 °F) (Temporal)   Resp 16   Ht 1.778 m (5' 10\")   Wt 66.7 kg (147 lb 0.8 oz)   SpO2 95%   BMI 21.10 kg/m²   Constitutional: Well developed, Well " nourished, No acute distress, Non-toxic appearance.   HENT: Normocephalic, Atraumatic, Bilateral external ears normal, Oropharynx is clear mucous membranes are moist. No oral exudates or nasal discharge.   Eyes: Pupils are equal round and reactive, EOMI, Conjunctiva normal, No discharge.   Neck: Normal range of motion, No tenderness, Supple, No stridor. No meningismus.  Lymphatic: No lymphadenopathy noted.   Cardiovascular: Regular rate and rhythm without murmur rub or gallop.  Thorax & Lungs: Clear breath sounds bilaterally without wheezes, rhonchi or rales. There is no chest wall tenderness.   Abdomen: Soft non-tender non-distended. There is no rebound or guarding. No organomegaly is appreciated. Bowel sounds are normal.  Skin: Normal without rash.   Back: No CVA or spinal tenderness.   Extremities: Intact distal pulses, No edema, No tenderness, No cyanosis, No clubbing. Capillary refill is less than 2 seconds.  Musculoskeletal: Good range of motion in all major joints. No tenderness to palpation or major deformities noted.   Neurologic: Alert & oriented x 3, Normal motor function, Normal sensory function, No focal deficits noted. Reflexes are normal.  Psychiatric: Affect normal, Judgment normal, Mood normal. There is no suicidal ideation or patient reported hallucinations.     DIAGNOSTIC STUDIES    Labs:   Labs Reviewed   CBC WITH DIFFERENTIAL - Abnormal; Notable for the following components:       Result Value    WBC 14.8 (*)     RBC 6.19 (*)     Hematocrit 53.4 (*)     MCHC 33.0 (*)     Neutrophils-Polys 86.80 (*)     Lymphocytes 7.60 (*)     Neutrophils (Absolute) 12.87 (*)     All other components within normal limits   COMP METABOLIC PANEL - Abnormal; Notable for the following components:    Total Bilirubin 1.7 (*)     Albumin 5.1 (*)     All other components within normal limits   CORRECTED CALCIUM   ESTIMATED GFR       COURSE & MEDICAL DECISION MAKING     ED Observation Status? No; Patient does not meet  criteria for ED Observation.     INITIAL ASSESSMENT, COURSE AND PLAN  Care Narrative: Patient has a complex medical history that suggestive of him having episodic binge drinking which may be contributing to alcohol dependence neurologically and at the receptor level which is putting him at risk for withdrawal seizures which would explain how his EEG has been negative in the past through Dr. Montgomery    I did not repeat his head CT as this was unnecessary given my review of his CT back in 2012 which was unremarkable.  Laboratory evaluation reveals white blood cell count almost 15,000 with 87% PMN shift this is likely secondary to his recent seizure his electrolyte panel is unremarkable.    1:04 PM - Patient seen and examined at bedside. The patient will be medicated with Keppra 1,500. Ordered for CBC w/ diff and CMP  to evaluate his symptoms.  Informed the patient and his family that I do not believe it is the time to change medications. I informed that it would be best to first discuss the patients girlfriend in detail what her concerns are. Furthermore, after discussion of the patient's drinking habits I advised that he attempt to cease drinking alcohol for a while.    2:27 PM - I reevaluated the patient at bedside. I discussed plan for discharge and follow up as outlined below. The patient is stable for discharge at this time and will return for any new or worsening symptoms. Patient verbalizes understanding and support with my plan for discharge.         DISPOSITION AND DISCUSSIONS  I have discussed management of the patient with the following physicians and CALI's:  None    Discussion of management with other [facility] or appropriate source(s): None      The patient will return for new or worsening symptoms and is stable at the time of discharge.    DISPOSITION:  Patient will be discharged home in stable condition.    FOLLOW UP:  Hussain Tee M.D.  [address] [place] [address]  [id number]  [phone]    Schedule an appointment as soon as possible for a visit         OUTPATIENT MEDICATIONS:  New Prescriptions    LEVETIRACETAM (KEPPRA) 500 MG TAB    Take 1 Tablet by mouth 2 times a day.        FINAL DIANGOSIS  1. Seizure (HCC)       Susan TRAN (Parvizibkerry), am scribing for, and in the presence of, Fredy Luis M.D..    Electronically signed by: Susan Rosas (Sirena), 4/3/2023    Fredy TRAN M.D. personally performed the services described in this documentation, as scribed by Susan Rosas in my presence, and it is both accurate and complete.      The note accurately reflects work and decisions made by me.  Fredy Luis M.D.  4/3/2023  2:45 PM

## 2023-10-02 ENCOUNTER — APPOINTMENT (OUTPATIENT)
Dept: RADIOLOGY | Facility: MEDICAL CENTER | Age: 38
DRG: 101 | End: 2023-10-02
Attending: EMERGENCY MEDICINE
Payer: MEDICAID

## 2023-10-02 ENCOUNTER — HOSPITAL ENCOUNTER (INPATIENT)
Facility: MEDICAL CENTER | Age: 38
LOS: 1 days | DRG: 101 | End: 2023-10-04
Attending: EMERGENCY MEDICINE | Admitting: STUDENT IN AN ORGANIZED HEALTH CARE EDUCATION/TRAINING PROGRAM
Payer: MEDICAID

## 2023-10-02 DIAGNOSIS — Z78.9 ALCOHOL USE: ICD-10-CM

## 2023-10-02 DIAGNOSIS — R56.9 SEIZURE (HCC): ICD-10-CM

## 2023-10-02 LAB
ALBUMIN SERPL BCP-MCNC: 4.7 G/DL (ref 3.2–4.9)
ALBUMIN/GLOB SERPL: 1.7 G/DL
ALP SERPL-CCNC: 72 U/L (ref 30–99)
ALT SERPL-CCNC: 25 U/L (ref 2–50)
ANION GAP SERPL CALC-SCNC: 21 MMOL/L (ref 7–16)
AST SERPL-CCNC: 38 U/L (ref 12–45)
BILIRUB SERPL-MCNC: 1.3 MG/DL (ref 0.1–1.5)
BUN SERPL-MCNC: 14 MG/DL (ref 8–22)
CALCIUM ALBUM COR SERPL-MCNC: 8.4 MG/DL (ref 8.5–10.5)
CALCIUM SERPL-MCNC: 9 MG/DL (ref 8.5–10.5)
CHLORIDE SERPL-SCNC: 103 MMOL/L (ref 96–112)
CO2 SERPL-SCNC: 13 MMOL/L (ref 20–33)
CREAT SERPL-MCNC: 0.98 MG/DL (ref 0.5–1.4)
EKG IMPRESSION: NORMAL
GFR SERPLBLD CREATININE-BSD FMLA CKD-EPI: 102 ML/MIN/1.73 M 2
GLOBULIN SER CALC-MCNC: 2.8 G/DL (ref 1.9–3.5)
GLUCOSE SERPL-MCNC: 155 MG/DL (ref 65–99)
POTASSIUM SERPL-SCNC: 3.7 MMOL/L (ref 3.6–5.5)
PROT SERPL-MCNC: 7.5 G/DL (ref 6–8.2)
SODIUM SERPL-SCNC: 137 MMOL/L (ref 135–145)

## 2023-10-02 PROCEDURE — 80053 COMPREHEN METABOLIC PANEL: CPT

## 2023-10-02 PROCEDURE — 96374 THER/PROPH/DIAG INJ IV PUSH: CPT

## 2023-10-02 PROCEDURE — 70450 CT HEAD/BRAIN W/O DYE: CPT

## 2023-10-02 PROCEDURE — A9270 NON-COVERED ITEM OR SERVICE: HCPCS | Performed by: EMERGENCY MEDICINE

## 2023-10-02 PROCEDURE — 93005 ELECTROCARDIOGRAM TRACING: CPT | Performed by: EMERGENCY MEDICINE

## 2023-10-02 PROCEDURE — 700102 HCHG RX REV CODE 250 W/ 637 OVERRIDE(OP): Performed by: EMERGENCY MEDICINE

## 2023-10-02 PROCEDURE — 700111 HCHG RX REV CODE 636 W/ 250 OVERRIDE (IP): Mod: JZ,UD | Performed by: EMERGENCY MEDICINE

## 2023-10-02 PROCEDURE — 71101 X-RAY EXAM UNILAT RIBS/CHEST: CPT | Mod: RT

## 2023-10-02 PROCEDURE — 99285 EMERGENCY DEPT VISIT HI MDM: CPT

## 2023-10-02 PROCEDURE — 96375 TX/PRO/DX INJ NEW DRUG ADDON: CPT

## 2023-10-02 PROCEDURE — 36415 COLL VENOUS BLD VENIPUNCTURE: CPT

## 2023-10-02 RX ORDER — ONDANSETRON 2 MG/ML
4 INJECTION INTRAMUSCULAR; INTRAVENOUS ONCE
Status: COMPLETED | OUTPATIENT
Start: 2023-10-02 | End: 2023-10-02

## 2023-10-02 RX ORDER — OXYCODONE HYDROCHLORIDE AND ACETAMINOPHEN 5; 325 MG/1; MG/1
2 TABLET ORAL ONCE
Status: COMPLETED | OUTPATIENT
Start: 2023-10-02 | End: 2023-10-02

## 2023-10-02 RX ORDER — MORPHINE SULFATE 4 MG/ML
4 INJECTION INTRAVENOUS ONCE
Status: COMPLETED | OUTPATIENT
Start: 2023-10-02 | End: 2023-10-02

## 2023-10-02 RX ORDER — LEVETIRACETAM 500 MG/5ML
1000 INJECTION, SOLUTION, CONCENTRATE INTRAVENOUS ONCE
Status: COMPLETED | OUTPATIENT
Start: 2023-10-02 | End: 2023-10-02

## 2023-10-02 RX ADMIN — ONDANSETRON 4 MG: 2 INJECTION INTRAMUSCULAR; INTRAVENOUS at 20:48

## 2023-10-02 RX ADMIN — LEVETIRACETAM 1000 MG: 100 INJECTION, SOLUTION, CONCENTRATE INTRAVENOUS at 20:31

## 2023-10-02 RX ADMIN — OXYCODONE AND ACETAMINOPHEN 2 TABLET: 5; 325 TABLET ORAL at 21:48

## 2023-10-02 RX ADMIN — MORPHINE SULFATE 4 MG: 4 INJECTION, SOLUTION INTRAMUSCULAR; INTRAVENOUS at 20:48

## 2023-10-02 ASSESSMENT — LIFESTYLE VARIABLES
HAVE YOU EVER FELT YOU SHOULD CUT DOWN ON YOUR DRINKING: NO
HAVE PEOPLE ANNOYED YOU BY CRITICIZING YOUR DRINKING: NO
CONSUMPTION TOTAL: INCOMPLETE
EVER FELT BAD OR GUILTY ABOUT YOUR DRINKING: NO
DO YOU DRINK ALCOHOL: YES
TOTAL SCORE: 0
TOTAL SCORE: 0
EVER HAD A DRINK FIRST THING IN THE MORNING TO STEADY YOUR NERVES TO GET RID OF A HANGOVER: NO
TOTAL SCORE: 0

## 2023-10-02 ASSESSMENT — FIBROSIS 4 INDEX: FIB4 SCORE: 1.04

## 2023-10-02 NOTE — Clinical Note
Chun Mckeon was seen and treated in our emergency department on 10/2/2023.  He may return to work on 10/06/2023.       If you have any questions or concerns, please don't hesitate to call.      Clay Colon D.O.

## 2023-10-03 ENCOUNTER — APPOINTMENT (OUTPATIENT)
Dept: RADIOLOGY | Facility: MEDICAL CENTER | Age: 38
DRG: 101 | End: 2023-10-03
Payer: MEDICAID

## 2023-10-03 PROBLEM — Z72.0 VAPES NICOTINE CONTAINING SUBSTANCE: Status: ACTIVE | Noted: 2023-10-03

## 2023-10-03 PROBLEM — R56.9 SEIZURE (HCC): Status: ACTIVE | Noted: 2023-10-03

## 2023-10-03 PROBLEM — E87.29 METABOLIC ACIDOSIS, INCREASED ANION GAP: Status: ACTIVE | Noted: 2023-10-03

## 2023-10-03 PROBLEM — E87.29 METABOLIC ACIDOSIS, INCREASED ANION GAP: Status: RESOLVED | Noted: 2023-10-03 | Resolved: 2023-10-03

## 2023-10-03 PROBLEM — N17.9 AKI (ACUTE KIDNEY INJURY) (HCC): Status: ACTIVE | Noted: 2023-10-03

## 2023-10-03 LAB
ANION GAP SERPL CALC-SCNC: 14 MMOL/L (ref 7–16)
APPEARANCE UR: CLEAR
BACTERIA #/AREA URNS HPF: NEGATIVE /HPF
BILIRUB UR QL STRIP.AUTO: NEGATIVE
BUN SERPL-MCNC: 17 MG/DL (ref 8–22)
CALCIUM SERPL-MCNC: 8.9 MG/DL (ref 8.5–10.5)
CHLORIDE SERPL-SCNC: 101 MMOL/L (ref 96–112)
CO2 SERPL-SCNC: 20 MMOL/L (ref 20–33)
COLOR UR: YELLOW
CREAT SERPL-MCNC: 1.6 MG/DL (ref 0.5–1.4)
CREAT UR-MCNC: 117.46 MG/DL
EPI CELLS #/AREA URNS HPF: NEGATIVE /HPF
GFR SERPLBLD CREATININE-BSD FMLA CKD-EPI: 57 ML/MIN/1.73 M 2
GLUCOSE SERPL-MCNC: 96 MG/DL (ref 65–99)
GLUCOSE UR STRIP.AUTO-MCNC: NEGATIVE MG/DL
HYALINE CASTS #/AREA URNS LPF: ABNORMAL /LPF
KETONES UR STRIP.AUTO-MCNC: ABNORMAL MG/DL
LEUKOCYTE ESTERASE UR QL STRIP.AUTO: NEGATIVE
MICRO URNS: ABNORMAL
NITRITE UR QL STRIP.AUTO: NEGATIVE
PH UR STRIP.AUTO: 5.5 [PH] (ref 5–8)
POTASSIUM SERPL-SCNC: 4 MMOL/L (ref 3.6–5.5)
PROT UR QL STRIP: 100 MG/DL
RBC # URNS HPF: ABNORMAL /HPF
RBC UR QL AUTO: ABNORMAL
SODIUM SERPL-SCNC: 135 MMOL/L (ref 135–145)
SODIUM UR-SCNC: 46 MMOL/L
SP GR UR STRIP.AUTO: 1.01
URATE CRY #/AREA URNS HPF: POSITIVE /HPF
UROBILINOGEN UR STRIP.AUTO-MCNC: 0.2 MG/DL
WBC #/AREA URNS HPF: ABNORMAL /HPF

## 2023-10-03 PROCEDURE — 82570 ASSAY OF URINE CREATININE: CPT

## 2023-10-03 PROCEDURE — 700117 HCHG RX CONTRAST REV CODE 255

## 2023-10-03 PROCEDURE — 70553 MRI BRAIN STEM W/O & W/DYE: CPT

## 2023-10-03 PROCEDURE — A9579 GAD-BASE MR CONTRAST NOS,1ML: HCPCS

## 2023-10-03 PROCEDURE — 81001 URINALYSIS AUTO W/SCOPE: CPT

## 2023-10-03 PROCEDURE — A9270 NON-COVERED ITEM OR SERVICE: HCPCS

## 2023-10-03 PROCEDURE — 99254 IP/OBS CNSLTJ NEW/EST MOD 60: CPT | Performed by: PSYCHIATRY & NEUROLOGY

## 2023-10-03 PROCEDURE — A9270 NON-COVERED ITEM OR SERVICE: HCPCS | Performed by: STUDENT IN AN ORGANIZED HEALTH CARE EDUCATION/TRAINING PROGRAM

## 2023-10-03 PROCEDURE — 700102 HCHG RX REV CODE 250 W/ 637 OVERRIDE(OP): Performed by: STUDENT IN AN ORGANIZED HEALTH CARE EDUCATION/TRAINING PROGRAM

## 2023-10-03 PROCEDURE — 700102 HCHG RX REV CODE 250 W/ 637 OVERRIDE(OP)

## 2023-10-03 PROCEDURE — 80048 BASIC METABOLIC PNL TOTAL CA: CPT

## 2023-10-03 PROCEDURE — 700105 HCHG RX REV CODE 258

## 2023-10-03 PROCEDURE — 99223 1ST HOSP IP/OBS HIGH 75: CPT | Performed by: STUDENT IN AN ORGANIZED HEALTH CARE EDUCATION/TRAINING PROGRAM

## 2023-10-03 PROCEDURE — 700111 HCHG RX REV CODE 636 W/ 250 OVERRIDE (IP): Mod: JZ

## 2023-10-03 PROCEDURE — 76775 US EXAM ABDO BACK WALL LIM: CPT

## 2023-10-03 PROCEDURE — 770001 HCHG ROOM/CARE - MED/SURG/GYN PRIV*

## 2023-10-03 PROCEDURE — 84300 ASSAY OF URINE SODIUM: CPT

## 2023-10-03 RX ORDER — LEVETIRACETAM 500 MG/1
500 TABLET ORAL 2 TIMES DAILY
Status: DISCONTINUED | OUTPATIENT
Start: 2023-10-03 | End: 2023-10-03

## 2023-10-03 RX ORDER — LABETALOL HYDROCHLORIDE 5 MG/ML
10 INJECTION, SOLUTION INTRAVENOUS EVERY 4 HOURS PRN
Status: DISCONTINUED | OUTPATIENT
Start: 2023-10-03 | End: 2023-10-04 | Stop reason: HOSPADM

## 2023-10-03 RX ORDER — ACETAMINOPHEN 325 MG/1
650 TABLET ORAL EVERY 6 HOURS PRN
Status: DISCONTINUED | OUTPATIENT
Start: 2023-10-03 | End: 2023-10-04 | Stop reason: HOSPADM

## 2023-10-03 RX ORDER — LORAZEPAM 2 MG/ML
0.5 INJECTION INTRAMUSCULAR EVERY 4 HOURS PRN
Status: DISCONTINUED | OUTPATIENT
Start: 2023-10-03 | End: 2023-10-04 | Stop reason: HOSPADM

## 2023-10-03 RX ORDER — AMOXICILLIN 250 MG
2 CAPSULE ORAL 2 TIMES DAILY
Status: DISCONTINUED | OUTPATIENT
Start: 2023-10-03 | End: 2023-10-04 | Stop reason: HOSPADM

## 2023-10-03 RX ORDER — BISACODYL 10 MG
10 SUPPOSITORY, RECTAL RECTAL
Status: DISCONTINUED | OUTPATIENT
Start: 2023-10-03 | End: 2023-10-04 | Stop reason: HOSPADM

## 2023-10-03 RX ORDER — MIDAZOLAM HYDROCHLORIDE 1 MG/ML
INJECTION INTRAMUSCULAR; INTRAVENOUS
Status: COMPLETED
Start: 2023-10-03 | End: 2023-10-03

## 2023-10-03 RX ORDER — LEVETIRACETAM 500 MG/1
1000 TABLET ORAL 2 TIMES DAILY
Status: DISCONTINUED | OUTPATIENT
Start: 2023-10-03 | End: 2023-10-04 | Stop reason: HOSPADM

## 2023-10-03 RX ORDER — POLYETHYLENE GLYCOL 3350 17 G/17G
1 POWDER, FOR SOLUTION ORAL
Status: DISCONTINUED | OUTPATIENT
Start: 2023-10-03 | End: 2023-10-04 | Stop reason: HOSPADM

## 2023-10-03 RX ORDER — SODIUM CHLORIDE 9 MG/ML
INJECTION, SOLUTION INTRAVENOUS CONTINUOUS
Status: ACTIVE | OUTPATIENT
Start: 2023-10-03 | End: 2023-10-03

## 2023-10-03 RX ORDER — MIDAZOLAM HYDROCHLORIDE 1 MG/ML
2 INJECTION INTRAMUSCULAR; INTRAVENOUS ONCE
Status: COMPLETED | OUTPATIENT
Start: 2023-10-03 | End: 2023-10-03

## 2023-10-03 RX ADMIN — MIDAZOLAM HYDROCHLORIDE 2 MG: 1 INJECTION, SOLUTION INTRAMUSCULAR; INTRAVENOUS at 01:40

## 2023-10-03 RX ADMIN — SODIUM CHLORIDE 1000 ML: 9 INJECTION, SOLUTION INTRAVENOUS at 11:57

## 2023-10-03 RX ADMIN — MIDAZOLAM HYDROCHLORIDE 2 MG: 1 INJECTION INTRAMUSCULAR; INTRAVENOUS at 01:40

## 2023-10-03 RX ADMIN — ACETAMINOPHEN 650 MG: 325 TABLET, FILM COATED ORAL at 21:36

## 2023-10-03 RX ADMIN — LEVETIRACETAM 1000 MG: 500 TABLET, FILM COATED ORAL at 17:37

## 2023-10-03 RX ADMIN — LEVETIRACETAM 500 MG: 500 TABLET, FILM COATED ORAL at 05:08

## 2023-10-03 RX ADMIN — GADOTERIDOL 15 ML: 279.3 INJECTION, SOLUTION INTRAVENOUS at 23:21

## 2023-10-03 ASSESSMENT — ENCOUNTER SYMPTOMS
DIARRHEA: 0
CHILLS: 0
ABDOMINAL PAIN: 0
SEIZURES: 1
FEVER: 0
SHORTNESS OF BREATH: 0
COUGH: 0
NAUSEA: 0
VOMITING: 0

## 2023-10-03 ASSESSMENT — PAIN DESCRIPTION - PAIN TYPE
TYPE: ACUTE PAIN
TYPE: ACUTE PAIN

## 2023-10-03 NOTE — ED NOTES
Pt medicated per ERP verbal order, pt combative and post-ictal. Security assisted RN and ED tech to move patient up in bed.

## 2023-10-03 NOTE — ED NOTES
Pt still post-ictal confused and combative. RN and ED tech assisted patient back into bed. Pt's mother at bedside.

## 2023-10-03 NOTE — ED NOTES
Rounded on pt. Pt resting in gurney with unlabored breathing. Awaiting bed assignment. Pt aware of POC. Call light within reach.

## 2023-10-03 NOTE — ED NOTES
"Pt discharged home with mother. IV discontinued and gauze placed, pt in possession of belongings. Pt is A/O x 4, ambulatory with a steady gait. Pt provided discharge education and information pertaining to medications and follow up appointments. Pt received copy of discharge instructions and verbalized understanding. Discussed signs and symptoms to return to the ER, patient verbalized understanding.     /64   Pulse 81   Temp 36.9 °C (98.5 °F) (Temporal)   Resp 14   Ht 1.778 m (5' 10\")   Wt 68 kg (150 lb)   SpO2 96%   BMI 21.52 kg/m²    "

## 2023-10-03 NOTE — ED NOTES
Patient awake now, A/o x 3, disoriented to situation. Re-oriented patient. Pt assisted into a gown. Placed back on monitor.

## 2023-10-03 NOTE — ASSESSMENT & PLAN NOTE
In setting of poor oral intake yesterday and seizure x2  Making urine  Will give some IV fluids, check UA, FENa  Avoid nephrotoxins  Trend BMP  Consider ultrasound if not improving / signs of obstruction

## 2023-10-03 NOTE — PROGRESS NOTES
[facility] Medicine Daily Progress Note    Date of Service  10/3/2023    Chief Complaint  Chun Mckeon is a 38 y.o. male admitted 10/2/2023 with seizure    Hospital Course  Chun Mckeon is a 38 y.o. male who presented 10/2/2023 with seizure.  PMH of seizure disorder.  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  He took only 1 shot yesterday was not drinking any more than that he does not usually drink heavily.  Afterwards he had seizure-like activity and was brought to the ED on 10/2.  He had forgotten to take his Keppra that day but usually takes it and is compliant with his medication.  He was loaded with Keppra in the ED and discharged.  After going home he had another seizure and was brought back into the hospital.  He is about to be discharged from the ED when he had another seizure in the ED which was witnessed, had a postictal period for about 15 minutes.     Patient has a history of seizures and was on Keppra about 10 years ago, he had epilepsy testing at that time and was tapered off Keppra.  He was fine for several years until 10/2022 when he had a seizure and was started on Keppra again.  He stopped taking his medications for 2 months.  And was fine until he had another seizure, restarted on Keppra a few months ago and has had no further seizure activity until yesterday.    Interval Problem Update  10/3: Last seizure-like activity was around 0200 this AM. His mother is at bedside and states he has been drowsy for several hours after this episode and is just now waking up more fully. He has minimal memory of these episodes. States he has cut back alcohol use over the last few months and only drinks 1-2 drinks occasionally. He does note that he has been working 20 hour workdays lately and had nothing to eat yesterday until 6pm. Requested neurology consult this AM for further guidance on workup and treatment adjustment.    Does  have an BASIM on labs. Due to above poor oral intake, will give IV fluids. Trend BMP. Check UA/FENa. Will consider ultrasound if not improving with fluids.       I have discussed this patient's plan of care and discharge plan at IDT rounds today with Case Management, Nursing, Nursing leadership, and other members of the IDT team.    Consultants/Specialty  neurology    Code Status  Full Code    Disposition  The patient is not medically cleared for discharge to home or a post-acute facility.  Anticipate discharge to: home with close outpatient follow-up    I have placed the appropriate orders for post-discharge needs.    Review of Systems  Review of Systems   Constitutional:  Negative for chills and fever.   Respiratory:  Negative for cough and shortness of breath.    Cardiovascular:  Negative for chest pain.   Gastrointestinal:  Negative for abdominal pain, diarrhea, nausea and vomiting.   Genitourinary:  Negative for dysuria and urgency.   Neurological:  Positive for seizures.        Physical Exam  Temp:  [36.4 °C (97.6 °F)-36.9 °C (98.5 °F)] 36.4 °C (97.6 °F)  Pulse:  [] 80  Resp:  [13-26] 26  BP: (101-148)/(58-80) 130/80  SpO2:  [91 %-99 %] 97 %    Physical Exam  Vitals and nursing note reviewed.   Constitutional:       Appearance: Normal appearance.   HENT:      Head: Normocephalic and atraumatic.      Mouth/Throat:      Mouth: Mucous membranes are moist.      Pharynx: Oropharynx is clear.   Eyes:      General: No scleral icterus.     Extraocular Movements: Extraocular movements intact.      Conjunctiva/sclera: Conjunctivae normal.   Cardiovascular:      Rate and Rhythm: Normal rate and regular rhythm.      Pulses: Normal pulses.      Heart sounds: Normal heart sounds.   Pulmonary:      Effort: Pulmonary effort is normal.      Breath sounds: Normal breath sounds.   Abdominal:      General: Abdomen is flat. There is no distension.      Palpations: Abdomen is soft.      Tenderness: There is no abdominal  tenderness. There is no right CVA tenderness or left CVA tenderness.   Musculoskeletal:         General: Normal range of motion.      Cervical back: Normal range of motion.   Skin:     General: Skin is warm and dry.   Neurological:      General: No focal deficit present.      Mental Status: He is alert and oriented to person, place, and time. Mental status is at baseline.   Psychiatric:         Mood and Affect: Mood normal.       Fluids  No intake or output data in the 24 hours ending 10/03/23 1022    Laboratory      Recent Labs     10/02/23  2040 10/03/23  0849   SODIUM 137 135   POTASSIUM 3.7 4.0   CHLORIDE 103 101   CO2 13* 20   GLUCOSE 155* 96   BUN 14 17   CREATININE 0.98 1.60*   CALCIUM 9.0 8.9     Imaging  CT-HEAD W/O   Final Result         1.  No acute intracranial abnormality.   2.  Mild right maxillary sinusitis changes         [id number] (WITH 1-VIEW CXR) RIGHT   Final Result         Normal rib series.           Assessment/Plan  * Seizure (HCC)- (present on admission)  Assessment & Plan  History of seizures and on Keppra, see HPI for more details  He missed 1 dose yesterday before having a seizure.  He was loaded with Keppra in the ED and discharged with and had another 2 seizures after that  Fall, seizure, aspiration precautions  Continue home Keppra  Ativan ordered as needed  Consulted Dr. Arteaga neurology this AM for further input    BASIM (acute kidney injury) (HCC)  Assessment & Plan  In setting of poor oral intake yesterday and seizure x2  Making urine  Will give some IV fluids, check UA, FENa  Avoid nephrotoxins  Trend BMP  Consider ultrasound if not improving / signs of obstruction    Metabolic acidosis, increased anion gap- (present on admission)  Assessment & Plan  Bicarb 13, anion gap 21 presentation.  Secondary to seizure activity.  Encourage p.o. intake  Improved on repeat BMP, trend    Vapes nicotine containing substance- (present on admission)  Assessment & Plan  Declines nicotine  patch         VTE prophylaxis:   SCDs/TEDs    I have performed a physical exam and reviewed and updated ROS and Plan today (10/3/2023). In review of yesterday's note (10/2/2023), there are no changes except as documented above.

## 2023-10-03 NOTE — ED NOTES
Provided Pt with urinal. Pt able to pivot and stand under his own strength.    Pt back in bed without incident.

## 2023-10-03 NOTE — ED TRIAGE NOTES
Patient to ED with complaints of witness seizure. He was apparently at a bar with friends when he collapsed and had witness seizure like activity for 3-4 minutes. Hx of seizure. Friends reported he had missed some doses of his Keppra. Pt denies missing doses. He is oriented to self only at this. JOSETTE.

## 2023-10-03 NOTE — H&P
Hospital Medicine History & Physical Note    Date of Service  10/3/2023    Primary Care Physician  Pcp Pt States None    Code Status  Full Code    Chief Complaint  Chief Complaint   Patient presents with    Seizure       History of Presenting Illness  Chun Mckeon is a 38 y.o. male who presented 10/2/2023 with seizure.  PMH of seizure disorder.  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  He took only 1 shot yesterday was not drinking any more than that he does not usually drink heavily.  Afterwards he had seizure-like activity and was brought to the ED on 10/2.  He had forgotten to take his Keppra that day but usually takes it and is compliant with his medication.  He was loaded with Keppra in the ED and discharged.  After going home he had another seizure and was brought back into the hospital.  He is about to be discharged from the ED when he had another seizure in the ED which was witnessed, had a postictal period for about 15 minutes.    Patient has a history of seizures and was on Keppra about 10 years ago, he had epilepsy testing at that time and was tapered off Keppra.  He was fine for several years until 10/2022 when he had a seizure and was started on Keppra again.  He stopped taking his medications for 2 months.  And was fine until he had another seizure, restarted on Keppra a few months ago and has been fine since then until tonight.    I discussed the plan of care with patient, family, bedside RN, and EDP .    Review of Systems  Review of Systems   Constitutional:  Negative for chills and fever.   Respiratory:  Negative for cough and shortness of breath.    Cardiovascular:  Negative for chest pain.   Gastrointestinal:  Negative for abdominal pain, diarrhea, nausea and vomiting.   Genitourinary:  Negative for dysuria and urgency.   Neurological:  Positive for seizures.       Past Medical History   has a past medical history of  ASTHMA, Bronchitis, Localization-related (focal) (partial) epilepsy and epileptic syndromes with complex partial seizures, without mention of intractable epilepsy, Seizure (HCC), and Tonsil, abscess.    Surgical History   has no past surgical history on file.     Family History  Family history reviewed with patient. There is no family history that is pertinent to the chief complaint.     Social History   reports that he has quit smoking. He has never used smokeless tobacco. He reports current alcohol use of about 3.0 oz of alcohol per week. He reports current drug use. Drugs: Marijuana, Cocaine, Oral, and Inhaled.    Allergies  Allergies   Allergen Reactions    Amoxicillin      Has tolerated cephalosporins in the past    Pcn [Penicillins]        Medications  Prior to Admission Medications   Prescriptions Last Dose Informant Patient Reported? Taking?   albuterol 108 (90 BASE) MCG/ACT Aero Soln inhalation aerosol  Patient Yes No   Sig: Inhale 2 Puffs by mouth every 6 hours as needed for Shortness of Breath.   albuterol 108 (90 BASE) MCG/ACT Aero Soln inhalation aerosol  Patient No No   Sig: Inhale 2 Puffs by mouth every four hours as needed for Shortness of Breath.   Patient not taking: Reported on 12/15/2020   clindamycin (CLEOCIN) 300 MG Cap   No No   Sig: Take 1 Cap by mouth 3 times a day.   levETIRAcetam (KEPPRA) 500 MG Tab   No No   Sig: Take 1 Tablet by mouth 2 times a day.   predniSONE (DELTASONE) 20 MG Tab   No No   Si pill twice a day for three days      Facility-Administered Medications: None       Physical Exam  Temp:  [36.5 °C (97.7 °F)-36.9 °C (98.5 °F)] 36.9 °C (98.5 °F)  Pulse:  [] 107  Resp:  [14-23] 14  BP: (104-148)/(60-76) 124/61  SpO2:  [91 %-99 %] 92 %  Blood Pressure: 124/61   Temperature: 36.9 °C (98.5 °F)   Pulse: (!) 107   Respiration: 14   Pulse Oximetry: 92 %       Physical Exam  Constitutional:       Appearance: Normal appearance.   HENT:      Head: Normocephalic and atraumatic.  "     Mouth/Throat:      Mouth: Mucous membranes are moist.      Pharynx: Oropharynx is clear. No oropharyngeal exudate or posterior oropharyngeal erythema.   Eyes:      General: No scleral icterus.  Cardiovascular:      Rate and Rhythm: Normal rate and regular rhythm.      Pulses: Normal pulses.      Heart sounds: Normal heart sounds. No murmur heard.  Pulmonary:      Effort: Pulmonary effort is normal. No respiratory distress.      Breath sounds: Normal breath sounds. No wheezing.   Abdominal:      Palpations: Abdomen is soft.      Tenderness: There is no abdominal tenderness.   Musculoskeletal:         General: No swelling or tenderness. Normal range of motion.      Cervical back: Normal range of motion.   Skin:     General: Skin is warm and dry.   Neurological:      General: No focal deficit present.      Mental Status: He is alert and oriented to person, place, and time. Mental status is at baseline.   Psychiatric:         Mood and Affect: Mood normal.         Laboratory:      Recent Labs     10/02/23  2040   SODIUM 137   POTASSIUM 3.7   CHLORIDE 103   CO2 13*   GLUCOSE 155*   BUN 14   CREATININE 0.98   CALCIUM 9.0     Recent Labs     10/02/23  2040   ALTSGPT 25   ASTSGOT 38   ALKPHOSPHAT 72   TBILIRUBIN 1.3   GLUCOSE 155*         No results for input(s): \"NTPROBNP\" in the last 72 hours.      No results for input(s): \"TROPONINT\" in the last 72 hours.    Imaging:  CT-HEAD W/O   Final Result         1.  No acute intracranial abnormality.   2.  Mild right maxillary sinusitis changes         [id number] (WITH 1-VIEW CXR) RIGHT   Final Result         Normal rib series.          EKG:  I have personally reviewed the images and compared with prior images. and My impression is: NSR    Assessment/Plan:  Justification for Admission Status  I anticipate this patient will require at least two midnights for appropriate medical management, necessitating inpatient admission because seizure    * Seizure (HCC)- (present on " admission)  Assessment & Plan  History of seizures and on Keppra, see HPI for more details  He missed 1 dose yesterday before having a seizure.  He was loaded with Keppra in the ED and discharged with and had another 2 seizures after that  Will admit and needs consult by neurology in the morning  Fall, seizure, aspiration precautions  Continue home Keppra  Ativan ordered as needed    Metabolic acidosis, increased anion gap- (present on admission)  Assessment & Plan  Bicarb 13, anion gap 21 presentation.  Secondary to seizure activity.  Encourage p.o. intake    Vapes nicotine containing substance- (present on admission)  Assessment & Plan  No nicotine patch needed

## 2023-10-03 NOTE — ED NOTES
Bedside report received from off going RN/tech: Clemente, assumed care of patient.  POC discussed with patient. Call light within reach, all needs addressed at this time.       Fall risk interventions in place: Not Applicable (all applicable per [place] Fall risk assessment)   Continuous monitoring: Cardiac Leads, Pulse Ox, or Blood Pressure  IVF/IV medications: Infusion per MAR (List Med(s)) NS continuous infusion  Oxygen: Room Air  Bedside sitter: Not Applicable   Isolation: Not Applicable

## 2023-10-03 NOTE — DISCHARGE INSTRUCTIONS
Discharge Instructions per Moncho Zepeda M.D.  DIET: Resume previous diet  Healthy diet. Plenty of vegetables.  ACTIVITY: Avoid strenuous activity or heavy lifting.   DIAGNOSIS: Principal Problem:    Seizure (HCC) (POA: Yes)  Active Problems:    Vapes nicotine containing substance (POA: Yes)    Metabolic acidosis, increased anion gap (POA: Yes)    BASIM (acute kidney injury) (HCC) (POA: Unknown)      Follow up instructions.  Please call [phone] to confirm appointments.  Follow up with primary provider or discharge clinic physician in 1 week  Can follow up with Pulmonology in 1-2 weeks for history of astma  Follow up with Neurology. Will need clearance.  Seizures (No  swimming, driving or operating machinery until cleared by neurology), alcohol use (avoid alcohol use on antiepileptics, check liver function panel with provider)            Return to ER in the event of new or worsening symptoms. Please note importance of compliance and the patient has agreed to proceed with all medical recommendations and follow up plan indicated above. All medications come with benefits and risks. Risks may include permanent injury or death and these risks can be minimized with close reassessment and monitoring. Please make it to your scheduled follow ups with Pcp Pt States None, and/or specialists clinic.

## 2023-10-03 NOTE — ED NOTES
Bedside report received from off going RN: Tabitha, assumed care of patient.  POC discussed with patient. Call light within reach, all needs addressed at this time.   Fall risk interventions in place: Patient's personal possessions are with in their safe reach, Place socks on patient, Give patient urinal if applicable, Keep floor surfaces clean and dry, and Accompanied to restroom (all applicable per [place] Fall risk assessment)   Continuous monitoring: Cardiac Leads, Pulse Ox, or Blood Pressure  IVF/IV medications: Not Applicable   Oxygen: Room Air  Bedside sitter: Not Applicable   Isolation: Not Applicable    Patient's mother at bedside, POC updated, questions and concerns answered at bedside. Pt provided a cup of water. Discussed awaiting CT head results, pt and mother are aware.

## 2023-10-03 NOTE — ED NOTES
Rounded on pt. Pt and family updated on bed placement process and updated on POC.  Pt and family verbalized understanding.  Call light within reach.

## 2023-10-03 NOTE — ASSESSMENT & PLAN NOTE
History of seizures and on Keppra, see HPI for more details  He missed 1 dose yesterday before having a seizure.  He was loaded with Keppra in the ED and discharged with and had another 2 seizures after that  Fall, seizure, aspiration precautions  Continue home Keppra  Ativan ordered as needed  Consulted Dr. Arteaga neurology this AM for further input

## 2023-10-03 NOTE — HOSPITAL COURSE
Chun Mckeon is a 38 y.o. male who presented 10/2/2023 with seizure.  PMH of seizure disorder.  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  He took only 1 shot yesterday was not drinking any more than that he does not usually drink heavily.  Afterwards he had seizure-like activity and was brought to the ED on 10/2.  He had forgotten to take his Keppra that day but usually takes it and is compliant with his medication.  He was loaded with Keppra in the ED and discharged.  After going home he had another seizure and was brought back into the hospital.  He is about to be discharged from the ED when he had another seizure in the ED which was witnessed, had a postictal period for about 15 minutes.     Patient has a history of seizures and was on Keppra about 10 years ago, he had epilepsy testing at that time and was tapered off Keppra.  He was fine for several years until 10/2022 when he had a seizure and was started on Keppra again.  He stopped taking his medications for 2 months.  And was fine until he had another seizure, restarted on Keppra a few months ago and has had no further seizure activity until yesterday.

## 2023-10-03 NOTE — DISCHARGE SUMMARY
"  ED Observation Discharge Summary    Patient:Chun ParkerShields  Patient : 1985  Patient MRN: [id number]  Patient PCP: Pcp Pt States None    Admit Date: 10/2/2023  Discharge Date and Time: 10/03/23 1:07 AM  Discharge Diagnosis: Breakthrough seizures  Discharge Attending: Eddie Borjas M.D.  Discharge Service: ED Observation    ED Course  Chun is a 38 y.o. male who was evaluated at [facility] for breakthrough seizures.  The patient had missed a dose of his Keppra and had a witnessed seizure prompting him to be seen in the ED.  There were no changes in semiology or prolonged postictal period initially noted.  He was Keppra loaded with 1000 mg IV.  A CMP and CT of the head were reassuring and the patient had no further seizure-like activity during a prolonged observation period in the ED so he was prepped for discharge.  Just prior to discharge he had an additional generalized tonic-clonic seizure that lasted approximately 90 seconds.  He then was postictal, confused and became somewhat combative requiring Versed for chemical restraint.  Given multiple breakthrough seizures in a 24-hour period despite Keppra loading he was admitted for neurology evaluation in the morning.    Discharge Exam:  /61   Pulse (!) 107   Temp 36.9 °C (98.5 °F) (Temporal)   Resp 14   Ht 1.778 m (5' 10\")   Wt 68 kg (150 lb)   SpO2 92%   BMI 21.52 kg/m² .    Constitutional: Awake and alert. Nontoxic  HENT:  Grossly normal  Eyes: Grossly normal  Neck: Normal range of motion  Cardiovascular: Normal heart rate   Thorax & Lungs: No respiratory distress  Abdomen: Nontender  Skin:  No pathologic rash.   Extremities: Well perfused  Psychiatric: Affect normal    Labs  Results for orders placed or performed during the hospital encounter of 10/02/23   COMP METABOLIC PANEL   Result Value Ref Range    Sodium 137 135 - 145 mmol/L    Potassium 3.7 3.6 - 5.5 mmol/L    Chloride 103 96 - 112 mmol/L    Co2 13 (L) 20 - 33 mmol/L    Anion Gap 21.0 " (H) 7.0 - 16.0    Glucose 155 (H) 65 - 99 mg/dL    Bun 14 8 - 22 mg/dL    Creatinine 0.98 0.50 - 1.40 mg/dL    Calcium 9.0 8.5 - 10.5 mg/dL    Correct Calcium 8.4 (L) 8.5 - 10.5 mg/dL    AST(SGOT) 38 12 - 45 U/L    ALT(SGPT) 25 2 - 50 U/L    Alkaline Phosphatase 72 30 - 99 U/L    Total Bilirubin 1.3 0.1 - 1.5 mg/dL    Albumin 4.7 3.2 - 4.9 g/dL    Total Protein 7.5 6.0 - 8.2 g/dL    Globulin 2.8 1.9 - 3.5 g/dL    A-G Ratio 1.7 g/dL   ESTIMATED GFR   Result Value Ref Range    GFR (CKD-EPI) 102 >60 mL/min/1.73 m 2   EKG   Result Value Ref Range    Report       [facility]    Test Date:  2023-10-02  Pt Name:    LITZY MARTINEZ               Department: ER  MRN:        [id number]                      Room:       [facility]  Gender:     Male                         Technician:  :        1985                   Requested By:ER TRIAGE PROTOCOL  Order #:    [id number]                    Reading MD:    Measurements  Intervals                                Axis  Rate:       80                           P:          89  AL:         163                          QRS:        85  QRSD:       72                           T:          79  QT:         406  QTc:        469    Interpretive Statements  Sinus rhythm  Biatrial enlargement  RSR' in V1 or V2, probably normal variant  No previous ECG available for comparison         Radiology  CT-HEAD W/O   Final Result         1.  No acute intracranial abnormality.   2.  Mild right maxillary sinusitis changes         [id number] (WITH 1-VIEW CXR) RIGHT   Final Result         Normal rib series.          Medications:   New Prescriptions    No medications on file       My final assessment includes multiple breakthrough seizures  Upon Reevaluation, the patient's condition has: not improved; and will be escalated to hospitalization.    Patient discharged from ED Observation status at  (Time) 10/3/2023 (Date).     Total time spent on this ED Observation  discharge encounter is > 30 Minutes    Electronically signed by: Eddie Borjas M.D., 10/3/2023 1:07 AM

## 2023-10-03 NOTE — ED PROVIDER NOTES
"ED Provider Note    CHIEF COMPLAINT  Chief Complaint   Patient presents with    Seizure       EXTERNAL RECORDS REVIEWED  Outpatient Notes   neurology notes    HPI/ROS  LIMITATION TO HISTORY   Select: : None  OUTSIDE HISTORIAN(S):  None    Chun Mckeon is a 38 y.o. male who presents here for evaluation of seizure.  Patient was allegedly out with his friends, at a bar, having some drinks.  He was standing, when he \"took a shot\" and fell backwards.  Patient started to have some rhythmic movements, likely seizure activity.  Patient states he normally takes Keppra, but has not taken it since the day before yesterday.  Patient unsure as if he struck his head, but he has no neck or back pain, no chest pain, no shortness of breath.  Patient states he has some right rib tenderness on the side, but no crepitus.    PAST MEDICAL HISTORY   has a past medical history of ASTHMA, Bronchitis, Localization-related (focal) (partial) epilepsy and epileptic syndromes with complex partial seizures, without mention of intractable epilepsy, Seizure (HCC), and Tonsil, abscess.    SURGICAL HISTORY  patient denies any surgical history    FAMILY HISTORY  Family History   Problem Relation Age of Onset    Stroke Neg Hx        SOCIAL HISTORY  Social History     Tobacco Use    Smoking status: Former    Smokeless tobacco: Never    Tobacco comments:     QUIT 2 MONTHS AGO   Vaping Use    Vaping Use: Some days   Substance and Sexual Activity    Alcohol use: Yes     Alcohol/week: 3.0 oz     Types: 5 Cans of beer per week     Comment: ETOh 2 times per week    Drug use: Yes     Types: Marijuana, Cocaine, Oral, Inhaled     Comment: cannabis     Sexual activity: Not Currently     Partners: Female       CURRENT MEDICATIONS  Home Medications    **Home medications have not yet been reviewed for this encounter**         ALLERGIES  Allergies   Allergen Reactions    Amoxicillin      Has tolerated cephalosporins in the past    Pcn [Penicillins]  " "      PHYSICAL EXAM  VITAL SIGNS: /74   Pulse 82   Temp 36.5 °C (97.7 °F) (Temporal)   Resp 20   Ht 1.778 m (5' 10\")   Wt 68 kg (150 lb)   SpO2 97%   BMI 21.52 kg/m²    Constitutional: Well developed, well nourished.  Mild acute distress.  HEENT: Normocephalic, atraumatic. Posterior pharynx clear and moist.  Eyes:  EOMI. Normal sclera.  Neck: Supple, Full range of motion, nontender line.  Chest/Pulmonary: clear to ausculation. Symmetrical expansion.  Right lateral mid axillary line rib tenderness to palpation, no step-off, no crepitus  Cardio: Regular rate and rhythm with no murmur.   Abdomen: Soft, nontender. No peritoneal signs. No guarding. No palpable masses.  Back: No CVA tenderness, nontender midline, no step offs.  Musculoskeletal: No deformity, no edema, neurovascular intact.   Neuro: Clear speech, appropriate, cooperative, cranial nerves II-XII grossly intact.  Psych: Normal mood and affect      DIAGNOSTIC STUDIES / PROCEDURES  Results for orders placed or performed during the hospital encounter of 10/02/23   COMP METABOLIC PANEL   Result Value Ref Range    Sodium 137 135 - 145 mmol/L    Potassium 3.7 3.6 - 5.5 mmol/L    Chloride 103 96 - 112 mmol/L    Co2 13 (L) 20 - 33 mmol/L    Anion Gap 21.0 (H) 7.0 - 16.0    Glucose 155 (H) 65 - 99 mg/dL    Bun 14 8 - 22 mg/dL    Creatinine 0.98 0.50 - 1.40 mg/dL    Calcium 9.0 8.5 - 10.5 mg/dL    Correct Calcium 8.4 (L) 8.5 - 10.5 mg/dL    AST(SGOT) 38 12 - 45 U/L    ALT(SGPT) 25 2 - 50 U/L    Alkaline Phosphatase 72 30 - 99 U/L    Total Bilirubin 1.3 0.1 - 1.5 mg/dL    Albumin 4.7 3.2 - 4.9 g/dL    Total Protein 7.5 6.0 - 8.2 g/dL    Globulin 2.8 1.9 - 3.5 g/dL    A-G Ratio 1.7 g/dL   ESTIMATED GFR   Result Value Ref Range    GFR (CKD-EPI) 102 >60 mL/min/1.73 m 2   EKG   Result Value Ref Range    Report       [facility] Emergency Dept.    Test Date:  2023-10-02  Pt Name:    LITZY MARITNEZ               Department: ER  MRN:        " [id number]                      Room:        17  Gender:     Male                         Technician:  :        1985                   Requested By:ER TRIAGE PROTOCOL  Order #:    [id number]                    Reading MD:    Measurements  Intervals                                Axis  Rate:       80                           P:          89  MS:         163                          QRS:        85  QRSD:       72                           T:          79  QT:         406  QTc:        469    Interpretive Statements  Sinus rhythm  Biatrial enlargement  RSR' in V1 or V2, probably normal variant  No previous ECG available for comparison       Ekg;  nsr 80. No st elevation, no st depression, qtc 469.  No previous ekg for comparison.     RADIOLOGY  I have independently interpreted the diagnostic imaging associated with this visit and am waiting the final reading from the radiologist.   My preliminary interpretation is as follows: see below  Radiologist interpretation:   [id number] (WITH 1-VIEW CXR) RIGHT   Final Result         Normal rib series.      CT-HEAD W/O    (Results Pending)         COURSE & MEDICAL DECISION MAKING      INITIAL ASSESSMENT, COURSE AND PLAN  Care Narrative: This is a 38-year-old male here for evaluation of seizure disorder.  Patient has a known seizure disorder for which she follows up with neurology for.  He has not taken his Keppra since stable for yesterday, and had a seizure tonight.  Patient CT scan is pending. and has a negative x-ray series.  Patient was loaded with Keppra IV, and has not had any seizure activity while here.    Dr. Borjas will follow up on the ct of the brain.       The pt has been placed in observation status at 10:50 pm on  10/2/23    DISPOSITION AND DISCUSSIONS  I have discussed management of the patient with the following physicians and CALI's:  none    Discussion of management with other QHP or appropriate source(s): None     Escalation of care considered, and  ultimately not performed:none    Barriers to care at this time, including but not limited to: Patient does not have established PCP.     Decision tools and prescription drugs considered including, but not limited to: none.    FINAL DIAGNOSIS  Seizure disorder       Electronically signed by: Clay Colon D.O., 10/2/2023 8:20 PM

## 2023-10-03 NOTE — CONSULTS
Neurology Consultation        Referring Physician:   Hebert Curtis    Referral Reason:  Seizures    HPI:  Chun is a 38-year-old man with a history of seizures which started in 2014.  It sounds like at that time there was some concern about it being related to substance use or alcohol so when he was seizure-free for a period time on Keppra he was eventually tapered off.  He does not think he ever had an MRI of the brain to evaluate for cause of seizures.  He went a long time without any seizure activity but a couple years ago they began to recur.  He was restarted on Keppra 500 mg twice daily but has had trouble getting into an outpatient neurologist to undergo further evaluation or medication management.  He presented to the emergency room yesterday with a breakthrough seizure he was being discharged at the second and third event so was admitted for observation.    He reports he does get an aura prior to his seizures that often consist of a weird panicky feeling or tingling in his face.  His mother notes that he often rubs his face prior to going into a seizure.  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  He was sitting on a stool at the bar and turned his whole body to the right and then his left arm flexed and he was helped to the ground where he had a generalized convulsion.  He does endorse epigastric rising sensation times prior to his seizures as well.  He denies any side effects his current dose of Keppra.    Past Medical History:  Active Ambulatory Problems     Diagnosis Date Noted    Partial epilepsy with impairment of consciousness (HCC)      Resolved Ambulatory Problems     Diagnosis Date Noted    No Resolved Ambulatory Problems     Past Medical History:   Diagnosis Date    BASIM (acute kidney injury) (HCC) 10/3/2023    ASTHMA     Bronchitis     Localization-related (focal) (partial) epilepsy and epileptic syndromes with complex partial seizures, without mention of intractable  epilepsy     Seizure (HCC)     Tonsil, abscess        Medications:  See MAR    Allergies:  Allergies   Allergen Reactions    Amoxicillin      Has tolerated cephalosporins in the past    Pcn [Penicillins]        Social History:  Social History     Socioeconomic History    Marital status: Single     Spouse name: Not on file    Number of children: Not on file    Years of education: Not on file    Highest education level: Not on file   Occupational History    Not on file   Tobacco Use    Smoking status: Former    Smokeless tobacco: Never    Tobacco comments:     QUIT 2 MONTHS AGO   Vaping Use    Vaping Use: Some days   Substance and Sexual Activity    Alcohol use: Yes     Alcohol/week: 3.0 oz     Types: 5 Cans of beer per week     Comment: ETOh 2 times per week    Drug use: Yes     Types: Marijuana, Cocaine, Oral, Inhaled     Comment: cannabis     Sexual activity: Not Currently     Partners: Female   Other Topics Concern    Not on file   Social History Narrative    Not on file     Social Determinants of Health     Financial Resource Strain: Not on file   Food Insecurity: Not on file   Transportation Needs: Not on file   Physical Activity: Not on file   Stress: Not on file   Social Connections: Not on file   Intimate Partner Violence: Not on file   Housing Stability: Not on file       Family History:  Family History   Problem Relation Age of Onset    Stroke Neg Hx        ROS:  All others negative except for what was mentioned in the HPI.    Physical Exam:  Vitals:   Vitals:    10/03/23 1131 10/03/23 1204 10/03/23 1304 10/03/23 1334   BP: 119/64 132/77 126/56 110/81   Pulse: 89 89 80 89   Resp: 19 14 16 16   Temp:       TempSrc:       SpO2: 94% 91% 96% 98%   Weight:       Height:         CONSTITUTIONAL:  Lying in the hospital bed in no apparent distress, appears well nourished  NECK:  Supple and non tender, no masses    NEUROLOGICAL EXAM  MENTAL STATUS:  Awake, alert, oriented times 3.  Speech is fluent, comprehension is  intact.  CRANIAL NERVES:  PERRL, EOMI with no nystagmus, face is symmetric, facial sensation is intact, tongue is in the midline, palate is symmetric.  MOTOR:  5/5 throughout  SENSATION:  Intact to light touch and proprioception throughout  COORDINATION:  Normal finger to nose and heel to shin bilaterally  GAIT:  Deferred, due to weakness and fall risk          Impression:  38-year-old man with focal onset seizures with secondary generalization.  Based on his description of the auras he almost certainly has a seizure focus in the temporal lobe.  Breakthrough seizures likely due to inadequate medical management.    Recommendations:  -Increase Keppra to 1000 mg twice daily  -Would recommend MRI brain with and without contrast to evaluate for structural cause of his seizures  -Please refer patient to outpatient epilepsy clinic care at [facility] so he can establish care with an outpatient neurologist.  -No further testing or treatment recommendations at this time.  Please call if any further questions or concerns arise.    Benjamin Arteaga MD

## 2023-10-03 NOTE — ED NOTES
Patient moved to a hospital bed, seizure precautions in place. Pt is now A/O x 4, pt cannot remember seizure event.

## 2023-10-04 VITALS
OXYGEN SATURATION: 97 % | HEIGHT: 70 IN | SYSTOLIC BLOOD PRESSURE: 129 MMHG | TEMPERATURE: 98.8 F | HEART RATE: 82 BPM | DIASTOLIC BLOOD PRESSURE: 79 MMHG | RESPIRATION RATE: 16 BRPM | WEIGHT: 142.42 LBS | BODY MASS INDEX: 20.39 KG/M2

## 2023-10-04 LAB
ALBUMIN SERPL BCP-MCNC: 4.3 G/DL (ref 3.2–4.9)
ALBUMIN/GLOB SERPL: 1.8 G/DL
ALP SERPL-CCNC: 69 U/L (ref 30–99)
ALT SERPL-CCNC: 24 U/L (ref 2–50)
ANION GAP SERPL CALC-SCNC: 11 MMOL/L (ref 7–16)
AST SERPL-CCNC: 54 U/L (ref 12–45)
BASOPHILS # BLD AUTO: 0.4 % (ref 0–1.8)
BASOPHILS # BLD: 0.04 K/UL (ref 0–0.12)
BILIRUB SERPL-MCNC: 2.2 MG/DL (ref 0.1–1.5)
BUN SERPL-MCNC: 15 MG/DL (ref 8–22)
CALCIUM ALBUM COR SERPL-MCNC: 8.3 MG/DL (ref 8.5–10.5)
CALCIUM SERPL-MCNC: 8.5 MG/DL (ref 8.5–10.5)
CHLORIDE SERPL-SCNC: 103 MMOL/L (ref 96–112)
CO2 SERPL-SCNC: 23 MMOL/L (ref 20–33)
CREAT SERPL-MCNC: 1.42 MG/DL (ref 0.5–1.4)
EOSINOPHIL # BLD AUTO: 0.02 K/UL (ref 0–0.51)
EOSINOPHIL NFR BLD: 0.2 % (ref 0–6.9)
ERYTHROCYTE [DISTWIDTH] IN BLOOD BY AUTOMATED COUNT: 41.7 FL (ref 35.9–50)
GFR SERPLBLD CREATININE-BSD FMLA CKD-EPI: 66 ML/MIN/1.73 M 2
GLOBULIN SER CALC-MCNC: 2.4 G/DL (ref 1.9–3.5)
GLUCOSE SERPL-MCNC: 105 MG/DL (ref 65–99)
HCT VFR BLD AUTO: 45.9 % (ref 42–52)
HGB BLD-MCNC: 15 G/DL (ref 14–18)
IMM GRANULOCYTES # BLD AUTO: 0.03 K/UL (ref 0–0.11)
IMM GRANULOCYTES NFR BLD AUTO: 0.3 % (ref 0–0.9)
LYMPHOCYTES # BLD AUTO: 2.14 K/UL (ref 1–4.8)
LYMPHOCYTES NFR BLD: 20.1 % (ref 22–41)
MCH RBC QN AUTO: 28.5 PG (ref 27–33)
MCHC RBC AUTO-ENTMCNC: 32.7 G/DL (ref 32.3–36.5)
MCV RBC AUTO: 87.3 FL (ref 81.4–97.8)
MONOCYTES # BLD AUTO: 1.07 K/UL (ref 0–0.85)
MONOCYTES NFR BLD AUTO: 10 % (ref 0–13.4)
NEUTROPHILS # BLD AUTO: 7.36 K/UL (ref 1.82–7.42)
NEUTROPHILS NFR BLD: 69 % (ref 44–72)
NRBC # BLD AUTO: 0 K/UL
NRBC BLD-RTO: 0 /100 WBC (ref 0–0.2)
PLATELET # BLD AUTO: 199 K/UL (ref 164–446)
PMV BLD AUTO: 9.8 FL (ref 9–12.9)
POTASSIUM SERPL-SCNC: 3.3 MMOL/L (ref 3.6–5.5)
PROT SERPL-MCNC: 6.7 G/DL (ref 6–8.2)
RBC # BLD AUTO: 5.26 M/UL (ref 4.7–6.1)
SODIUM SERPL-SCNC: 137 MMOL/L (ref 135–145)
WBC # BLD AUTO: 10.7 K/UL (ref 4.8–10.8)

## 2023-10-04 PROCEDURE — 700102 HCHG RX REV CODE 250 W/ 637 OVERRIDE(OP)

## 2023-10-04 PROCEDURE — 80053 COMPREHEN METABOLIC PANEL: CPT

## 2023-10-04 PROCEDURE — A9270 NON-COVERED ITEM OR SERVICE: HCPCS | Performed by: INTERNAL MEDICINE

## 2023-10-04 PROCEDURE — A9270 NON-COVERED ITEM OR SERVICE: HCPCS

## 2023-10-04 PROCEDURE — 99239 HOSP IP/OBS DSCHRG MGMT >30: CPT | Performed by: INTERNAL MEDICINE

## 2023-10-04 PROCEDURE — 700102 HCHG RX REV CODE 250 W/ 637 OVERRIDE(OP): Performed by: INTERNAL MEDICINE

## 2023-10-04 PROCEDURE — 85025 COMPLETE CBC W/AUTO DIFF WBC: CPT

## 2023-10-04 RX ORDER — GAUZE BANDAGE 2" X 2"
100 BANDAGE TOPICAL DAILY
Status: DISCONTINUED | OUTPATIENT
Start: 2023-10-04 | End: 2023-10-04 | Stop reason: HOSPADM

## 2023-10-04 RX ORDER — FOLIC ACID 1 MG/1
1 TABLET ORAL DAILY
Status: DISCONTINUED | OUTPATIENT
Start: 2023-10-04 | End: 2023-10-04 | Stop reason: HOSPADM

## 2023-10-04 RX ORDER — LEVETIRACETAM 1000 MG/1
1000 TABLET ORAL 2 TIMES DAILY
Qty: 60 TABLET | Refills: 0 | Status: SHIPPED | OUTPATIENT
Start: 2023-10-04 | End: 2023-11-27 | Stop reason: SDUPTHER

## 2023-10-04 RX ORDER — LANOLIN ALCOHOL/MO/W.PET/CERES
100 CREAM (GRAM) TOPICAL DAILY
Qty: 30 TABLET | COMMUNITY
Start: 2023-10-04 | End: 2024-01-04

## 2023-10-04 RX ORDER — FOLIC ACID 1 MG/1
1 TABLET ORAL DAILY
Qty: 30 TABLET | COMMUNITY
Start: 2023-10-04 | End: 2024-01-04

## 2023-10-04 RX ADMIN — THERA TABS 1 TABLET: TAB at 15:04

## 2023-10-04 RX ADMIN — FOLIC ACID 1 MG: 1 TABLET ORAL at 15:05

## 2023-10-04 RX ADMIN — LEVETIRACETAM 1000 MG: 500 TABLET, FILM COATED ORAL at 05:35

## 2023-10-04 RX ADMIN — Medication 100 MG: at 15:04

## 2023-10-04 ASSESSMENT — PAIN DESCRIPTION - PAIN TYPE
TYPE: ACUTE PAIN
TYPE: ACUTE PAIN

## 2023-10-04 ASSESSMENT — FIBROSIS 4 INDEX: FIB4 SCORE: 1.99

## 2023-10-04 NOTE — PROGRESS NOTES
Patient transported off the unit for scheduled MRI.   Patient accompanied by the transportation team.

## 2023-10-04 NOTE — PROGRESS NOTES
4 Eyes Skin Assessment Completed by FRANCISCO Mccarthy and FRANCISCO Franz.    Head WDL  Ears WDL  Nose WDL  Mouth WDL  Neck WDL  Breast/Chest WDL  Shoulder Blades WDL  Spine WDL  (R) Arm/Elbow/Hand WDL  (L) Arm/Elbow/Hand WDL  Abdomen WDL  Groin WDL  Scrotum/Coccyx/Buttocks WDL  (R) Leg WDL  (L) Leg WDL  (R) Heel/Foot/Toe WDL  (L) Heel/Foot/Toe WDL          Devices In Places       Interventions In Place N/A    Possible Skin Injury No    Pictures Uploaded Into Epic N/A  Wound Consult Placed N/A  RN Wound Prevention Protocol Ordered No

## 2023-10-04 NOTE — CARE PLAN
The patient is Stable - Low risk of patient condition declining or worsening    Shift Goals  Clinical Goals: No complications related to seizures; Pain management; Safety/mobility; MRI  Patient Goals: Drink fluids;    Progress made toward(s) clinical / shift goals:  Patient remains alert and oriented X 4. Neuro checks continued with no new complications. MRI completed during this shift. Patient continues pain management and seizure precautions, as ordered. Patient completed ordered IV NS. Will continue to monitor.     Problem: Pain - Standard  Goal: Alleviation of pain or a reduction in pain to the patient’s comfort goal  Outcome: Progressing     Problem: Knowledge Deficit - Standard  Goal: Patient and family/care givers will demonstrate understanding of plan of care, disease process/condition, diagnostic tests and medications  Outcome: Progressing     Problem: Seizure Precautions  Goal: Implementation of seizure precautions  Outcome: Progressing     Problem: Physical Regulation  Goal: Decrease in complications related to the disease process, condition or treatment  Outcome: Progressing      Problem: Knowledge Deficit - Seizures  Goal: Knowledge of seizure treatment regimen will improve  Outcome: Progressing       Patient is not progressing towards the following goals:

## 2023-10-04 NOTE — DISCHARGE SUMMARY
"Discharge Summary    CHIEF COMPLAINT ON ADMISSION  Chief Complaint   Patient presents with    Seizure       Reason for Admission  EMS     Admission Date  10/2/2023    CODE STATUS  Full Code    HPI & HOSPITAL COURSE  This is a 38 y.o. male here with Seizure  Please review Dr. Lety El M.D. notes for further details of history of present illness, past medical/social/family histories, allergies and medications. Please review Dr. Arteaga, Neurology consultation notes.  He has a history of seizure and remote history of asthma. He presented with seizure like activity and feeling malaised. He is not a heavy drinker but did admit he drank a shot of alcohol and he also is usually compliant with Keppra but forgot to take it for a day. He blames it from stress as he had a lot of \"meetings\"  At the ED, he is afebrile, hemodynamically stable.  No leukocytosis, not hypoglycemic.  CT head no acute intracranial abnormality  Neurology consulted. Keppra increased to 1000mg IV  MRI brain unremarkable. I gave him thiamine, vitamins and folate.  No further seizures. He was cleared by Neurology to be discharged on Keppra and DMV reporting was done.    His Cr- trended down. Renal US showed no hydronephrosis. Urinalysis was not indicative of UTI. He can follow up with his primary provider.    At discharge date, Chun Mckeon afebrile and hemodynamically stable.  Chun Mckeon wanted to be discharged today.    Imaging  MR-BRAIN-WITH & W/O   Final Result      Unremarkable pre and postcontrast MR examination of the brain. There is no hippocampal sclerosis, cortical dysplasia, neuronal migrational abnormality or intra-axial space-occupying lesion.      US-RENAL   Final Result      1.  No hydronephrosis.   2.  Some mild debris in the urinary bladder. Correlate for cystitis.      CT-HEAD W/O   Final Result         1.  No acute intracranial abnormality.   2.  Mild right maxillary sinusitis changes         [id number] " (WITH 1-VIEW CXR) RIGHT   Final Result         Normal rib series.                   Therefore, he is discharged in fair and stable condition to home with close outpatient follow-up.    The patient met 2-midnight criteria for an inpatient stay at the time of discharge.    Discharge Date  10/4/2023    FOLLOW UP ITEMS POST DISCHARGE      DISCHARGE DIAGNOSES  Principal Problem:    Seizure (HCC) (POA: Yes)  Active Problems:    Vapes nicotine containing substance (POA: Yes)    Metabolic acidosis, increased anion gap (POA: Yes)    BASIM (acute kidney injury) (HCC) (POA: Unknown)        FOLLOW UP  No future appointments.  [address] [address]  [phone]      Assign and follow up with primary provider or discharge clinic physician in 1 week  Can follow up with Pulmonology in 1-2 weeks for history of astma  Follow up with Neurology in 1 week. Will need clearance.  Seizures (No  swimming, driving or operating machinery until cleared by neurology), alcohol use (avoid alcohol use on antiepileptics, check liver function panel with provider)              MEDICATIONS ON DISCHARGE     Medication List        START taking these medications        Instructions   folic acid 1 MG Tabs  Commonly known as: Folvite   Take 1 Tablet by mouth every day.  Dose: 1 mg     multivitamin Tabs   Take 1 Tablet by mouth every day.  Dose: 1 Tablet     thiamine 100 MG tablet  Commonly known as: Thiamine   Take 1 Tablet by mouth every day.  Dose: 100 mg            CHANGE how you take these medications        Instructions   levetiracetam 1000 MG tablet  What changed:   medication strength  how much to take  when to take this  Commonly known as: Keppra   Take 1 Tablet by mouth 2 times a day.  Dose: 1,000 mg            CONTINUE taking these medications        Instructions   albuterol 108 (90 Base) MCG/ACT Aers inhalation aerosol   Inhale 2 Puffs by mouth every four hours as needed for Shortness of Breath.  Dose: 2  Puff              Allergies  Allergies   Allergen Reactions    Amoxicillin      Has tolerated cephalosporins in the past    Pcn [Penicillins]        DIET  Orders Placed This Encounter   Procedures    Diet Order Diet: Regular     Standing Status:   Standing     Number of Occurrences:   1     Order Specific Question:   Diet:     Answer:   Regular [1]       ACTIVITY  Avoid heavy lifting or strenuous activity  See above    CONSULTATIONS  Neurology    PROCEDURES      LABORATORY  Lab Results   Component Value Date    SODIUM 137 10/04/2023    POTASSIUM 3.3 (L) 10/04/2023    CHLORIDE 103 10/04/2023    CO2 23 10/04/2023    GLUCOSE 105 (H) 10/04/2023    BUN 15 10/04/2023    CREATININE 1.42 (H) 10/04/2023        Lab Results   Component Value Date    WBC 10.7 10/04/2023    HEMOGLOBIN 15.0 10/04/2023    HEMATOCRIT 45.9 10/04/2023    PLATELETCT 199 10/04/2023        Total time of the discharge process exceeds 40 minutes.

## 2023-10-04 NOTE — PROGRESS NOTES
DMV reporting complete.   Ensured patient had a follow up with PCP scheduled.  Patient informed to follow up with neurology.

## 2023-10-04 NOTE — PROGRESS NOTES
Patient has returned to the unit from scheduled MRI.   No signs of distress or discomfort observed.

## 2023-10-10 ENCOUNTER — OFFICE VISIT (OUTPATIENT)
Dept: INTERNAL MEDICINE | Facility: OTHER | Age: 38
End: 2023-10-10
Payer: MEDICAID

## 2023-10-10 VITALS
BODY MASS INDEX: 21.67 KG/M2 | TEMPERATURE: 97.1 F | SYSTOLIC BLOOD PRESSURE: 100 MMHG | HEART RATE: 89 BPM | WEIGHT: 143 LBS | DIASTOLIC BLOOD PRESSURE: 71 MMHG | OXYGEN SATURATION: 97 % | HEIGHT: 68 IN

## 2023-10-10 DIAGNOSIS — R56.9 SEIZURE (HCC): ICD-10-CM

## 2023-10-10 DIAGNOSIS — F10.90 ALCOHOL USE DISORDER: ICD-10-CM

## 2023-10-10 PROBLEM — N17.9 AKI (ACUTE KIDNEY INJURY) (HCC): Status: RESOLVED | Noted: 2023-10-03 | Resolved: 2023-10-10

## 2023-10-10 PROBLEM — E87.29 METABOLIC ACIDOSIS, INCREASED ANION GAP: Status: RESOLVED | Noted: 2023-10-03 | Resolved: 2023-10-10

## 2023-10-10 PROBLEM — Z72.0 VAPES NICOTINE CONTAINING SUBSTANCE: Status: RESOLVED | Noted: 2023-10-03 | Resolved: 2023-10-10

## 2023-10-10 PROBLEM — G40.209 PARTIAL EPILEPSY WITH IMPAIRMENT OF CONSCIOUSNESS (HCC): Status: RESOLVED | Noted: 2022-08-07 | Resolved: 2023-10-10

## 2023-10-10 PROBLEM — G40.209 PARTIAL EPILEPSY WITH IMPAIRMENT OF CONSCIOUSNESS (HCC): Status: ACTIVE | Noted: 2022-08-07

## 2023-10-10 PROCEDURE — 3074F SYST BP LT 130 MM HG: CPT

## 2023-10-10 PROCEDURE — 99203 OFFICE O/P NEW LOW 30 MIN: CPT | Mod: GC

## 2023-10-10 PROCEDURE — 3078F DIAST BP <80 MM HG: CPT

## 2023-10-10 ASSESSMENT — PATIENT HEALTH QUESTIONNAIRE - PHQ9: CLINICAL INTERPRETATION OF PHQ2 SCORE: 0

## 2023-10-10 ASSESSMENT — FIBROSIS 4 INDEX: FIB4 SCORE: 1.99

## 2023-10-10 NOTE — PROGRESS NOTES
"    Patient Care Team:  Armando R Reyes Yparraguirre, M.D. as PCP - General (Internal Medicine)    CHIEF COMPLAINT:   Chief Complaint   Patient presents with    [facility] Care    Seizure     HPI: Chun Mckeon is a 38 y.o. male with past medical history of \"epilepsy\", who presents today after hospital admission for recurrent seizures and alcohol use disorder.    When patient was in his early 20s, he was diagnosed with epilepsy (per patient's report, no info on chart) after having multiple episodes of seizures (does not specify characteristics).  He was put on Keppra and did not had any episodes for 5 years before being tapered down.  In 2022 (approximately after 9 years of discontinuing Keppra) patient experienced witnessed seizure while sleeping (girlfriend saw him, does not specify characteristics) after having had few alcoholic drinks in the afternoon.  Since that day, patient's been having recurrent episodes of seizures.  Last one, was while having a drink with a friend in a bar, patient has a video of it.  In the video, patient is seen sitting in a bar having a glass of water and then suddenly start working back-and-forth and laughing and progressively falling.  Whole body apparently went rigid but there was no sudden fall, friends around him were able to lower him to the floor where he started having mild tonic clonic seizures with relaxation of the sphincters (urination).  Episode lasted approximately 3 minutes, patient lost consciousness, unclear if postictal state, no memory of event.    Of note, patient has been a  for several years (approximately 15 years) and is used to having \"casual drinks\" daily.  Has a history of alcohol use disorder and cocaine use (does not specify the amount or frequency but last heavy drink episode was at least 8 years ago).  Currently, he says that he thinks he drinks a couple of beers every day while working, but is not really certain of the amount.    Past " Medical History:   Diagnosis Date    ASTHMA     Seizure (HCC)      Family History   Problem Relation Age of Onset    Stroke Neg Hx      Social History     Socioeconomic History    Marital status: Single   Tobacco Use    Smoking status: Former    Smokeless tobacco: Never    Tobacco comments:     QUIT 2 MONTHS AGO   Vaping Use    Vaping Use: Some days   Substance and Sexual Activity    Alcohol use: Yes     Alcohol/week: 3.0 oz     Types: 5 Cans of beer per week     Comment: ETOh 2 times per week    Drug use: Yes     Types: Marijuana, Cocaine, Oral, Inhaled     Comment: cannabis     Sexual activity: Not Currently     Partners: Female     Social Determinants of Health     Alcohol Use: Unknown (11/6/2020)    AUDIT-C     Frequency of Alcohol Consumption: 2-3 times a week     Average Number of Drinks: Not on file     Frequency of Binge Drinking: Not on file   Depression: Not at risk (10/10/2023)    PHQ-2     PHQ-2 Score: 0   Financial Resource Strain: Not on file   Food Insecurity: Not on file   Housing Stability: Not on file   Intimate Partner Violence: Not on file   Physical Activity: Not on file   Social Connections: Not on file   Stress: Not on file   Tobacco Use: Medium Risk (10/10/2023)    Patient History     Smoking Tobacco Use: Former     Smokeless Tobacco Use: Never     Passive Exposure: Not on file   Transportation Needs: Not on file   Utilities: Not on file     No past surgical history on file.    Current Outpatient Medications   Medication Sig Dispense Refill    levetiracetam (KEPPRA) 1000 MG tablet Take 1 Tablet by mouth 2 times a day. 60 Tablet 0    albuterol 108 (90 BASE) MCG/ACT Aero Soln inhalation aerosol Inhale 2 Puffs by mouth every four hours as needed for Shortness of Breath. 1 Inhaler 0    folic acid (FOLVITE) 1 MG Tab Take 1 Tablet by mouth every day. (Patient not taking: Reported on 10/10/2023) 30 Tablet     multivitamin Tab Take 1 Tablet by mouth every day. (Patient not taking: Reported on  "10/10/2023) 30 Tablet     thiamine (THIAMINE) 100 MG tablet Take 1 Tablet by mouth every day. (Patient not taking: Reported on 10/10/2023) 30 Tablet      No current facility-administered medications for this visit.     Allergies: Amoxicillin and Pcn [penicillins]  BMI: Body mass index is 21.74 kg/m².  ASCVD: The ASCVD Risk score (Yakov VILLATORO, et al., 2019) failed to calculate.    Vitals: /71 (BP Location: Left arm, Patient Position: Sitting, BP Cuff Size: Large adult)   Pulse 89   Temp 36.2 °C (97.1 °F) (Temporal)   Ht 1.727 m (5' 8\")   Wt 64.9 kg (143 lb)   SpO2 97%   BMI 21.74 kg/m²     Physical Exam  Constitutional:       Appearance: Normal appearance. He is normal weight.   HENT:      Head: Normocephalic and atraumatic.      Right Ear: Tympanic membrane normal.      Left Ear: Tympanic membrane normal.      Nose: Nose normal.      Mouth/Throat:      Mouth: Mucous membranes are moist.      Pharynx: Oropharynx is clear.   Eyes:      Extraocular Movements: Extraocular movements intact.      Conjunctiva/sclera: Conjunctivae normal.      Pupils: Pupils are equal, round, and reactive to light.   Cardiovascular:      Rate and Rhythm: Normal rate and regular rhythm.      Pulses: Normal pulses.      Heart sounds: Normal heart sounds.   Pulmonary:      Effort: Pulmonary effort is normal.      Breath sounds: Normal breath sounds.   Abdominal:      General: Abdomen is flat. Bowel sounds are normal.   Musculoskeletal:         General: Normal range of motion.      Cervical back: Normal range of motion and neck supple.   Skin:     General: Skin is warm.      Capillary Refill: Capillary refill takes less than 2 seconds.   Neurological:      General: No focal deficit present.      Mental Status: He is alert and oriented to person, place, and time. Mental status is at baseline.   Psychiatric:         Mood and Affect: Mood normal.         Behavior: Behavior normal.         Thought Content: Thought content normal.         " "Judgment: Judgment normal.     Assessment and Plan: Chun Mckeon is a 38 y.o. male with past medical history of \"epilepsy\", who presents today after hospital admission for recurrent seizures and alcohol use disorder.    1. Seizure (HCC): Diagnosed in late 20s, on Keppra for 5 years and weaned off without problems.  Recurrent episodes of started 8 years after initial diagnosis.  Recent admission restarted Keppra, needs official diagnosis and follow-up with neurology.  Last episode was in the bar started with apparent petit mal (rocking back and forth) that turned into tonic-clonic seizures with loss of consciousness, sphincter relaxation and loss of memory.  No status epilepticus.  Of note, patient [place] in [facility] [place] years ago and some episodes have happened after drinking alcohol.  Has a history of cocaine use in his early 20s but not recently drug use disorder.  MRI benign, brain tumor not likely.  Unclear if epilepsy due to late onset but will need definitive testing.  Could consider other conditions such as alcohol withdrawal and neurocysticercosis.  Will refer him to neurology for further management.  - Referral to Neurology    2. Alcohol use disorder: Mostly during early 20s, never formally diagnosed, never received treatment.  Does not specify drink of choice, frequency or quantity.  Decreased consumption at least 8 years ago.  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  Never intoxicated, does not affect daily activities.  LFTs elevated AST/ALT 2:1 mild increase total bilirubin, no cytopenia.  Audit C 9 (4 or more times a week, 5 or 6, weekly) which is consistent with alcohol misuse and possible liver damage.  On interview, patient expresses control of drinking, denies cravings, is aware of consequences, no compulsion and has already started to cut back (due to recently restart Keppra).  No signs of addictive " personality, apparent social consumption but higher than usual.  Highly interested in strategies to manage this issue.  Will recommend thiamine replenishment, follow-up on repeat LFTs 3 months from now and refer to psychology.  - Referral to Psychology    Next visit: Follow-up on neurology's recommendations, management of alcohol use disorder and or other control LFTs.    Armando R. Reyes Yparraguirre, M.D.  Internal Medicine Resident PGY-1  [facility]

## 2023-11-27 ENCOUNTER — OFFICE VISIT (OUTPATIENT)
Dept: INTERNAL MEDICINE | Facility: OTHER | Age: 38
End: 2023-11-27
Payer: MEDICAID

## 2023-11-27 VITALS
TEMPERATURE: 97.7 F | BODY MASS INDEX: 21.98 KG/M2 | DIASTOLIC BLOOD PRESSURE: 73 MMHG | OXYGEN SATURATION: 94 % | HEART RATE: 94 BPM | SYSTOLIC BLOOD PRESSURE: 105 MMHG | HEIGHT: 68 IN | WEIGHT: 145 LBS

## 2023-11-27 DIAGNOSIS — Z23 NEED FOR INFLUENZA VACCINATION: ICD-10-CM

## 2023-11-27 DIAGNOSIS — R56.9 SEIZURE (HCC): ICD-10-CM

## 2023-11-27 DIAGNOSIS — Z11.59 NEED FOR HEPATITIS C SCREENING TEST: ICD-10-CM

## 2023-11-27 DIAGNOSIS — F10.90 ALCOHOL USE DISORDER: ICD-10-CM

## 2023-11-27 DIAGNOSIS — Z11.4 SCREENING FOR HIV (HUMAN IMMUNODEFICIENCY VIRUS): ICD-10-CM

## 2023-11-27 PROCEDURE — 3078F DIAST BP <80 MM HG: CPT

## 2023-11-27 PROCEDURE — 3074F SYST BP LT 130 MM HG: CPT

## 2023-11-27 PROCEDURE — 90471 IMMUNIZATION ADMIN: CPT

## 2023-11-27 PROCEDURE — 99214 OFFICE O/P EST MOD 30 MIN: CPT | Mod: 25,GC

## 2023-11-27 PROCEDURE — 90686 IIV4 VACC NO PRSV 0.5 ML IM: CPT

## 2023-11-27 RX ORDER — LEVETIRACETAM 1000 MG/1
1000 TABLET ORAL 2 TIMES DAILY
Qty: 60 TABLET | Refills: 3 | Status: SHIPPED | OUTPATIENT
Start: 2023-11-27 | End: 2024-01-04 | Stop reason: SDUPTHER

## 2023-11-27 RX ORDER — LEVETIRACETAM 1000 MG/1
1000 TABLET ORAL 2 TIMES DAILY
Qty: 60 TABLET | Refills: 0 | Status: SHIPPED | OUTPATIENT
Start: 2023-11-27 | End: 2023-11-27 | Stop reason: SDUPTHER

## 2023-11-27 ASSESSMENT — FIBROSIS 4 INDEX: FIB4 SCORE: 1.99

## 2023-11-27 NOTE — PATIENT INSTRUCTIONS
Referral information sent to the following:  Psychology     [address]  [facility] [address] [address]  Phone: [phone]     Patient Care Coordination notes:  Ready to schedule

## 2023-11-27 NOTE — PROGRESS NOTES
"    Chief Complaint   Patient presents with    Follow-Up     HPI: Chun Mckeon is a 38 y.o. male with pertinent past medical history of epilepsy (Keppra, referred to neurology), intermittent asthma (fluticasone, albuterol), and alcohol use disorder (referred to psychology), who presents today asymptomatic for follow-up.    Since last visit, patient is undergoing work-up for seizures with neurology.  EEG December 5.  Has not had any seizure-like activity, only endorses a flare of her asthma during viral episode a couple of weeks ago that resolved without any complications.    Of note, patient also has an authorized referral to psychology for alcohol use disorder.  He states that he will make appointment soon.    Vitals:    11/27/23 0938   BP: 105/73   BP Location: Left arm   Patient Position: Sitting   BP Cuff Size: Large adult   Pulse: 94   Temp: 36.5 °C (97.7 °F)   TempSrc: Temporal   SpO2: 94%   Weight: 65.8 kg (145 lb)   Height: 1.727 m (5' 8\")   Body mass index is 22.05 kg/m².    Review of Systems   All other systems reviewed and are negative.     Physical Exam  Constitutional:       Appearance: Normal appearance. He is normal weight.   HENT:      Head: Normocephalic and atraumatic.   Cardiovascular:      Rate and Rhythm: Normal rate and regular rhythm.      Pulses: Normal pulses.      Heart sounds: Normal heart sounds.   Pulmonary:      Effort: Pulmonary effort is normal.      Breath sounds: Normal breath sounds.   Abdominal:      General: Bowel sounds are normal.   Musculoskeletal:         General: Normal range of motion.      Cervical back: Normal range of motion and neck supple.   Skin:     General: Skin is warm.      Capillary Refill: Capillary refill takes less than 2 seconds.   Neurological:      General: No focal deficit present.      Mental Status: He is alert and oriented to person, place, and time. Mental status is at baseline.   Psychiatric:         Mood and Affect: Mood normal.         " Home Behavior: Behavior normal.         Thought Content: Thought content normal.         Judgment: Judgment normal.     MEDICAL DECISION MAKIN-year-old male with pertinent past medical history of asthma, alcohol use disorder and epilepsy, currently on Keppra with no apparent side effects.  Asymptomatic, currently being worked up by neurology.  Couple weeks ago had mild flare of asthma, self-limited.  Generally speaking, asthma is under control.  Counseled on lifestyle changes and health maintenance.  Patient agrees to influenza shot and screening for hep C/HIV.     ASSESSMENT AND PLAN:   1. Seizure (HCC)  - levetiracetam (KEPPRA) 1000 MG tablet; Take 1 Tablet by mouth 2 times a day.  Dispense: 60 Tablet; Refill: 3    2. Alcohol use disorder    3. Need for influenza vaccination  - Influenza Vaccine Quad Injection (PF)    4. Screening for HIV (human immunodeficiency virus)  - HIV AG/AB COMBO ASSAY SCREENING; Future    5. Need for hepatitis C screening test  - HEP C VIRUS ANTIBODY; Future  - HCV Scrn ( [id number] 1xLife - Medicare Patients Only); Future    Armando R. Reyes Yparraguirre, M.D.  Internal Medicine Resident   [facility]      This note was generated using voice recognition software which has a small chance of producing errors of grammar and possibly content. I have made every reasonable attempt to find and correct any obvious errors but expect that some may not be found prior to finalization of this note.

## 2024-01-04 ENCOUNTER — OFFICE VISIT (OUTPATIENT)
Dept: INTERNAL MEDICINE | Facility: OTHER | Age: 39
End: 2024-01-04
Payer: MEDICAID

## 2024-01-04 VITALS
WEIGHT: 147.2 LBS | HEART RATE: 98 BPM | DIASTOLIC BLOOD PRESSURE: 67 MMHG | OXYGEN SATURATION: 94 % | HEIGHT: 68 IN | TEMPERATURE: 97.6 F | BODY MASS INDEX: 22.31 KG/M2 | SYSTOLIC BLOOD PRESSURE: 101 MMHG

## 2024-01-04 DIAGNOSIS — J45.998 UNCONTROLLED PERSISTENT ASTHMA: ICD-10-CM

## 2024-01-04 DIAGNOSIS — R56.9 SEIZURE (HCC): ICD-10-CM

## 2024-01-04 PROCEDURE — 1126F AMNT PAIN NOTED NONE PRSNT: CPT

## 2024-01-04 PROCEDURE — 3074F SYST BP LT 130 MM HG: CPT

## 2024-01-04 PROCEDURE — 3078F DIAST BP <80 MM HG: CPT

## 2024-01-04 PROCEDURE — 99214 OFFICE O/P EST MOD 30 MIN: CPT | Mod: GC

## 2024-01-04 RX ORDER — LEVETIRACETAM 1000 MG/1
1000 TABLET ORAL 2 TIMES DAILY
Qty: 60 TABLET | Refills: 3 | Status: SHIPPED | OUTPATIENT
Start: 2024-01-04

## 2024-01-04 RX ORDER — FLUTICASONE PROPIONATE 50 MCG
1 SPRAY, SUSPENSION (ML) NASAL DAILY
COMMUNITY
End: 2024-01-04

## 2024-01-04 RX ORDER — FLUTICASONE PROPIONATE AND SALMETEROL 100; 50 UG/1; UG/1
1 POWDER RESPIRATORY (INHALATION) EVERY 12 HOURS
Qty: 1 EACH | Refills: 1 | Status: SHIPPED | OUTPATIENT
Start: 2024-01-04

## 2024-01-04 ASSESSMENT — PATIENT HEALTH QUESTIONNAIRE - PHQ9: CLINICAL INTERPRETATION OF PHQ2 SCORE: 0

## 2024-01-04 ASSESSMENT — ENCOUNTER SYMPTOMS: WHEEZING: 1

## 2024-01-04 ASSESSMENT — FIBROSIS 4 INDEX: FIB4 SCORE: 1.99

## 2024-01-04 ASSESSMENT — PAIN SCALES - GENERAL: PAINLEVEL: NO PAIN

## 2024-01-04 NOTE — PROGRESS NOTES
"    Chief Complaint   Patient presents with    Seizure     Patient here for follow up calling for EEG     HPI: Chun Mckeon is a 38 y.o. male with pertinent past medical history of epilepsy (levetiracetam), asthma (albuterol), and alcohol and tobacco use disorder, presents today complaining of wheezing episodes at night and during exercise that require albuterol.    Since last visit, persistent episodes of wheezing, every day of the week, usually at night and during exercise that resolved with albuterol, mild, no significant shortness of breath, no dizziness/lightheadedness, no chest discomfort.  He is compliant with inhaled fluticasone in the a.m., and has decreased tobacco consumption (currently vapes occasionally).    Regarding epilepsy, he is being followed by neurology and had recent EEG within normal limits.  Denies any acute events in the past month, and is compliant with levetiracetam.    Of note, has significantly decreased the amount of alcohol he consumes, approximately 1 glass of wine weekly.    Vitals:    01/04/24 0916   BP: 101/67   BP Location: Right arm   Patient Position: Sitting   BP Cuff Size: Adult   Pulse: 98   Temp: 36.4 °C (97.6 °F)   TempSrc: Temporal   SpO2: 94%   Weight: 66.8 kg (147 lb 3.2 oz)   Height: 1.727 m (5' 8\")   Body mass index is 22.38 kg/m².    Review of Systems   Respiratory:  Positive for wheezing.    All other systems reviewed and are negative.     Physical Exam  Cardiovascular:      Rate and Rhythm: Normal rate and regular rhythm.      Pulses: Normal pulses.      Heart sounds: Normal heart sounds.   Pulmonary:      Effort: Pulmonary effort is normal. No respiratory distress.      Breath sounds: No stridor. No wheezing, rhonchi or rales.   Chest:      Chest wall: No tenderness.   Abdominal:      General: Bowel sounds are normal.   Musculoskeletal:         General: Normal range of motion.   Skin:     General: Skin is warm.      Capillary Refill: Capillary refill takes " less than 2 seconds.   Neurological:      Mental Status: He is alert. Mental status is at baseline.   Psychiatric:         Mood and Affect: Mood normal.         Behavior: Behavior normal.         Thought Content: Thought content normal.         Judgment: Judgment normal.     MEDICAL DECISION MAKIN-year-old male with pertinent past medical history of epilepsy, uncontrolled moderate persistent asthma, and multisubstance use disorder including alcohol and tobacco, presents today for everyday nightly and on exercise episodes of wheezing with no alarm signs that resolve with albuterol.  Epilepsy is apparently controlled and being followed by neurologist and has recent EEG within normal limits.  Regarding asthma, he is having persistent episodes of wheezing at night and during exercise send likely resolved with albuterol and have no alarm signs.  He is compliant with fluticasone, but will need introducing a LABA and decreasing vaping.  Also, reassuring that he has cut down in alcohol but will still check LFTs since they were abnormal on 2023.  Patient agrees with treatment.  On next visit, will evaluate lab work and response to inhalers.     ASSESSMENT AND PLAN:   1. Uncontrolled persistent asthma  - Comp Metabolic Panel; Future  - PULMONARY FUNCTION TESTS -Test requested: Complete Pulmonary Function Test; Future  - fluticasone-salmeterol (ADVAIR) 100-50 MCG/ACT AEROSOL POWDER, BREATH ACTIVATED; Inhale 1 Puff every 12 hours.  Dispense: 1 Each; Refill: 1    2. Seizure (HCC)  - levetiracetam (KEPPRA) 1000 MG tablet; Take 1 Tablet by mouth 2 times a day.  Dispense: 60 Tablet; Refill: 3    Armando R. Reyes Yparraguirre, M.D.  Internal Medicine Resident   [facility]      This note was generated using voice recognition software which has a small chance of producing errors of grammar and possibly content. I have made every reasonable attempt to find and correct any obvious errors  but expect that some may not be found prior to finalization of this note.

## 2024-05-23 DIAGNOSIS — R56.9 SEIZURE (HCC): ICD-10-CM

## 2024-05-23 NOTE — TELEPHONE ENCOUNTER
Received request via: Pharmacy    Was the patient seen in the last year in this department? Yes    Does the patient have an active prescription (recently filled or refills available) for medication(s) requested? No    Pharmacy Name: [facility]     Does the patient have [facility] Plus and need 100 day supply (blood pressure, diabetes and cholesterol meds only)? Patient does not have SCP

## 2024-05-26 RX ORDER — LEVETIRACETAM 1000 MG/1
1000 TABLET ORAL 2 TIMES DAILY
Qty: 60 TABLET | Refills: 0 | Status: SHIPPED | OUTPATIENT
Start: 2024-05-26

## 2024-06-21 DIAGNOSIS — R56.9 SEIZURE (HCC): ICD-10-CM

## 2024-06-21 NOTE — TELEPHONE ENCOUNTER
Received request via: Pharmacy    Was the patient seen in the last year in this department? Yes    Does the patient have an active prescription (recently filled or refills available) for medication(s) requested? No    [facility]    Does the patient have [facility] Plus and need 100 day supply (blood pressure, diabetes and cholesterol meds only)? Patient does not have SCP

## 2024-06-26 RX ORDER — LEVETIRACETAM 1000 MG/1
1000 TABLET ORAL 2 TIMES DAILY
Qty: 60 TABLET | Refills: 2 | Status: SHIPPED | OUTPATIENT
Start: 2024-06-26

## 2024-08-27 DIAGNOSIS — J45.998 UNCONTROLLED PERSISTENT ASTHMA: ICD-10-CM

## 2024-08-27 NOTE — TELEPHONE ENCOUNTER
Received request via: Pharmacy    Was the patient seen in the last year in this department? Yes    Does the patient have an active prescription (recently filled or refills available) for medication(s) requested?  yes    Pharmacy Name: [facility]    Does the patient have [facility] and need 100-day supply? (This applies to ALL medications) Patient does not have SCP

## 2024-08-29 RX ORDER — FLUTICASONE PROPIONATE AND SALMETEROL 100; 50 UG/1; UG/1
1 POWDER RESPIRATORY (INHALATION) EVERY 12 HOURS
Qty: 1 EACH | Refills: 3 | Status: SHIPPED | OUTPATIENT
Start: 2024-08-29

## 2024-09-25 DIAGNOSIS — R56.9 SEIZURE (HCC): ICD-10-CM

## 2024-09-25 NOTE — TELEPHONE ENCOUNTER
Received request via: Pharmacy    Was the patient seen in the last year in this department? Yes    Does the patient have an active prescription (recently filled or refills available) for medication(s) requested?  yes    Pharmacy Name: [facility]    Does the patient have [facility] Plus and need 100-day supply? (This applies to ALL medications) Patient does not have SCP

## 2024-09-30 RX ORDER — LEVETIRACETAM 1000 MG/1
1000 TABLET ORAL 2 TIMES DAILY
Qty: 60 TABLET | Refills: 3 | Status: SHIPPED | OUTPATIENT
Start: 2024-09-30

## 2025-01-22 DIAGNOSIS — R56.9 SEIZURE (HCC): ICD-10-CM

## 2025-01-22 NOTE — TELEPHONE ENCOUNTER
Received request via: Pharmacy    Was the patient seen in the last year in this department? Yes    Does the patient have an active prescription (recently filled or refills available) for medication(s) requested? No    Pharmacy Name: [id number] - [place], [place] - [address]     Does the patient have [facility] living Plus and need 100-day supply? (This applies to ALL medications) Patient does not have SCP

## 2025-01-29 RX ORDER — LEVETIRACETAM 1000 MG/1
1000 TABLET ORAL 2 TIMES DAILY
Qty: 60 TABLET | Refills: 0 | Status: SHIPPED | OUTPATIENT
Start: 2025-01-29

## 2025-02-24 DIAGNOSIS — R56.9 SEIZURE (HCC): ICD-10-CM

## 2025-02-24 RX ORDER — LEVETIRACETAM 1000 MG/1
1000 TABLET ORAL 2 TIMES DAILY
Qty: 30 TABLET | Refills: 0 | Status: SHIPPED | OUTPATIENT
Start: 2025-02-24

## 2025-02-25 NOTE — TELEPHONE ENCOUNTER
Received request via: Pharmacy    Was the patient seen in the last year in this department? No, past due for appt     Does the patient have an active prescription (recently filled or refills available) for medication(s) requested? No    Pharmacy Name:   [id number] - [place], [place] - [address]  [address]  [place] [address]  Phone: [phone] Fax: [phone]    Does the patient have long-term Plus and need 100-day supply? (This applies to ALL medications) Patient does not have SCP

## 2025-04-02 ENCOUNTER — OFFICE VISIT (OUTPATIENT)
Dept: INTERNAL MEDICINE | Facility: OTHER | Age: 40
End: 2025-04-02

## 2025-04-02 VITALS
HEART RATE: 80 BPM | BODY MASS INDEX: 23.13 KG/M2 | TEMPERATURE: 98.4 F | WEIGHT: 152.6 LBS | DIASTOLIC BLOOD PRESSURE: 67 MMHG | OXYGEN SATURATION: 98 % | SYSTOLIC BLOOD PRESSURE: 114 MMHG | HEIGHT: 68 IN

## 2025-04-02 DIAGNOSIS — R56.9 SEIZURE (HCC): ICD-10-CM

## 2025-04-02 DIAGNOSIS — J45.909 UNCOMPLICATED ASTHMA, UNSPECIFIED ASTHMA SEVERITY, UNSPECIFIED WHETHER PERSISTENT: ICD-10-CM

## 2025-04-02 DIAGNOSIS — Z13.228 SCREENING FOR METABOLIC DISORDER: ICD-10-CM

## 2025-04-02 DIAGNOSIS — Z11.59 NEED FOR HEPATITIS C SCREENING TEST: ICD-10-CM

## 2025-04-02 PROCEDURE — 3078F DIAST BP <80 MM HG: CPT | Mod: GE

## 2025-04-02 PROCEDURE — 3074F SYST BP LT 130 MM HG: CPT | Mod: GE

## 2025-04-02 PROCEDURE — 99213 OFFICE O/P EST LOW 20 MIN: CPT | Mod: GE

## 2025-04-02 PROCEDURE — 1126F AMNT PAIN NOTED NONE PRSNT: CPT | Mod: GE

## 2025-04-02 RX ORDER — LEVETIRACETAM 1000 MG/1
1000 TABLET ORAL 2 TIMES DAILY
Qty: 60 TABLET | Refills: 1 | Status: SHIPPED | OUTPATIENT
Start: 2025-04-02

## 2025-04-02 ASSESSMENT — FIBROSIS 4 INDEX: FIB4 SCORE: 2.05

## 2025-04-02 ASSESSMENT — PAIN SCALES - GENERAL: PAINLEVEL_OUTOF10: NO PAIN

## 2025-04-02 ASSESSMENT — PATIENT HEALTH QUESTIONNAIRE - PHQ9: CLINICAL INTERPRETATION OF PHQ2 SCORE: 0

## 2025-04-02 NOTE — PROGRESS NOTES
Chief Complaint   Patient presents with    Seizure    Medication Refill    Referral Needed     Neurology referral seizures       HISTORY OF PRESENT ILLNESS: Patient is a pleasant 39 y.o. male established patient of Dr. Reyes who presents today for the following.  He has past medical history as below    Seizure (HCC)  Patient with known history of seizure for years.  Started around 2014.  States that he presents with grand mal seizures normally when he is sleeping as witnessed by his girlfriend.  States that from 2022- 2023 he has had 3 episodes, 2 of them were during his sleep and last one was while he was awake.  Last episode was on October 2023 but this was the time that he remembers that he was not able to take his levetiracetam.  He is currently on levetiracetam 1000 mg twice daily and is compliant.  However , he has not seen neurology for the past 5 years and is requesting for a referral.  In the past, they were trying to titrate off his dose however was noted to have breakthrough seizures hence was placed back on the current dose.  Since 2023, he does not have any recurrence of seizure  and has been compliant on his medication.  Previous workup for seizure including MRI done 2023 was negative for any space-occupying lesion or other pathology.  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 States occasional smoking of cigarette when his father invites him to.    Past Medical History:   Diagnosis Date    ASTHMA     Seizure ([facility])        Patient Active Problem List    Diagnosis Date Noted    Uncontrolled persistent asthma 01/04/2024    Alcohol use disorder 10/10/2023    Seizure ([facility]) 10/03/2023       Amoxicillin and Pcn [penicillins]    Current Outpatient Medications   Medication Sig Dispense Refill    levetiracetam (KEPPRA) 1000 MG tablet Take 1 Tablet by mouth 2 times a day. 60 Tablet 1  "   fluticasone-salmeterol (ADVAIR) 100-50 MCG/ACT AEROSOL POWDER, BREATH ACTIVATED INHALE ONE PUFF BY MOUTH EVERY TWELVE HOURS 1 Each 3    albuterol 108 (90 BASE) MCG/ACT Aero Soln inhalation aerosol Inhale 2 Puffs by mouth every four hours as needed for Shortness of Breath. 1 Inhaler 0     No current facility-administered medications for this visit.       Social History     Tobacco Use    Smoking status: Former    Smokeless tobacco: Never    Tobacco comments:     QUIT 2 MONTHS AGO   Vaping Use    Vaping status: Former   Substance Use Topics    Alcohol use: Yes     Alcohol/week: 3.0 oz     Types: 5 Cans of beer per week     Comment: ETOh 2 times per week    Drug use: Yes     Types: Marijuana, Cocaine, Oral, Inhaled     Comment: cannabis        Family History   Problem Relation Age of Onset    Stroke Neg Hx        Review of Systems   All other systems reviewed and are negative.      Exam:  /67 (BP Location: Left arm, Patient Position: Sitting, BP Cuff Size: Adult)   Pulse 80   Temp 36.9 °C (98.4 °F) (Temporal)   Ht 1.727 m (5' 8\")   Wt 69.2 kg (152 lb 9.6 oz)   SpO2 98%  Body mass index is 23.2 kg/m².    Constitutional:  Not in acute distress, well appearing.  HEENT:   NC/AT  Cardiovascular: Regular rate and rhythm. No murmurs or gallops.      Lungs:   Clear to auscultation bilaterally. No wheezes or crackles. No respiratory distress.  Abdomen: Not distended, soft, not tender. No guarding or rigidity. No masses.  Extremities:  No cyanosis/clubbing/edema. No obvious deformities.  Skin:  Warm and dry.  No visible rashes.  Neurologic: Alert & oriented x 3,  strength and sensation grossly intact.  No focal deficits noted.  Psychiatric:  Affect normal, mood normal, judgment normal.    Assessment/Plan:     1. Seizure (HCC)  -Discussed importance of strict adherence to levetiracetam, previous workup negative.  No recurrence since 2023  -Referral to neurology placed  - levetiracetam (KEPPRA) 1000 MG tablet; Take 1 " Tablet by mouth 2 times a day.  Dispense: 60 Tablet; Refill: 1  - Referral to Neurology    2. Screening for metabolic disorder  - Comp Metabolic Panel; Future  - Lipid Profile; Future  - HEMOGLOBIN A1C; Future    3. Need for hepatitis C screening test  - HEP C VIRUS ANTIBODY; Future    4.  Preventive care   -Advised pneumococcal vaccine due to asthma( he has history of asthma well-controlled on Advair)  -Will consider on next visit      All imaging results and lab results and consult notes are reviewed at this visit.  Followup: Return in about 4 weeks (around 4/30/2025).    Please note that this dictation was created using voice recognition software. I have made every reasonable attempt to correct obvious errors, but I expect that there are errors of grammar and possibly content that I did not discover before finalizing the note.    LARRY CARRILLO MD  PGY-3

## 2025-04-08 NOTE — Clinical Note
REFERRAL APPROVAL NOTICE         Sent on April 8, 2025                   Chun Mckeon  [address]  [place] [address]                   Dear Mr. Mckeon,    After a careful review of the medical information and benefit coverage, [facility] has processed your referral. See below for additional details.    If applicable, you must be actively enrolled with your insurance for coverage of the authorized service. If you have any questions regarding your coverage, please contact your insurance directly.    REFERRAL INFORMATION   Referral #:  [id number]  Referred-To Department    Referred-By Provider:  Neurology    Malina Lackey M.D.   Neurology [facility]      [address] [facility] [address]  [phone] [address] [facility] [address]  [facility] [id number] [phone]    Referral Start Date:  04/02/2025  Referral End Date:   04/02/2026           SCHEDULING  If you do not already have an appointment, please call [phone] to make an appointment.   MORE INFORMATION  As a reminder, [facility] ownership has changed, meaning this location is now owned and operated by [facility]. As such, we want to clarify that our patients should expect to receive two separate bills for the services received at [facility] - one representing the [facility] facility fees as the owner of the establishment, and the other to represent the physician's services and subsequent fees. You can speak with your insurance carrier for a pricing estimate by calling the customer service number on the back of your card and ask about charges for a hospital outpatient visit.  If you do not already have a [employer] account, sign up at: [email or web address]  You can access your medical information, make appointments, see lab results, billing information, and more.  If you have questions regarding this referral, please contact  the [facility] Referrals department at:             [phone]. Monday - Friday  7:30AM - 5:00PM.      Sincerely,  [facility]

## 2025-04-10 ENCOUNTER — TELEPHONE (OUTPATIENT)
Dept: HEALTH INFORMATION MANAGEMENT | Facility: OTHER | Age: 40
End: 2025-04-10

## 2025-06-17 DIAGNOSIS — R56.9 SEIZURE (HCC): ICD-10-CM

## 2025-06-17 RX ORDER — LEVETIRACETAM 1000 MG/1
1000 TABLET ORAL 2 TIMES DAILY
Qty: 60 TABLET | Refills: 6 | Status: SHIPPED | OUTPATIENT
Start: 2025-06-17

## 2025-07-17 DIAGNOSIS — J45.998 UNCONTROLLED PERSISTENT ASTHMA: ICD-10-CM

## 2025-07-17 RX ORDER — ALBUTEROL SULFATE 90 UG/1
2 INHALANT RESPIRATORY (INHALATION) EVERY 4 HOURS PRN
Qty: 1 EACH | Refills: 0 | Status: SHIPPED | OUTPATIENT
Start: 2025-07-17

## 2025-07-17 RX ORDER — FLUTICASONE PROPIONATE AND SALMETEROL 100; 50 UG/1; UG/1
1 POWDER RESPIRATORY (INHALATION) EVERY 12 HOURS
Qty: 1 EACH | Refills: 3 | Status: SHIPPED | OUTPATIENT
Start: 2025-07-17